# Patient Record
Sex: MALE | Race: WHITE
[De-identification: names, ages, dates, MRNs, and addresses within clinical notes are randomized per-mention and may not be internally consistent; named-entity substitution may affect disease eponyms.]

---

## 2017-01-15 ENCOUNTER — HOSPITAL ENCOUNTER (INPATIENT)
Dept: HOSPITAL 62 - ER | Age: 47
LOS: 3 days | Discharge: HOME | DRG: 193 | End: 2017-01-18
Attending: INTERNAL MEDICINE | Admitting: INTERNAL MEDICINE
Payer: MEDICAID

## 2017-01-15 DIAGNOSIS — J44.1: ICD-10-CM

## 2017-01-15 DIAGNOSIS — J84.89: ICD-10-CM

## 2017-01-15 DIAGNOSIS — G47.33: ICD-10-CM

## 2017-01-15 DIAGNOSIS — J96.21: ICD-10-CM

## 2017-01-15 DIAGNOSIS — J18.9: Primary | ICD-10-CM

## 2017-01-15 DIAGNOSIS — J96.22: ICD-10-CM

## 2017-01-15 DIAGNOSIS — F17.210: ICD-10-CM

## 2017-01-15 DIAGNOSIS — D35.2: ICD-10-CM

## 2017-01-15 DIAGNOSIS — E03.9: ICD-10-CM

## 2017-01-15 DIAGNOSIS — E87.2: ICD-10-CM

## 2017-01-15 LAB
ANION GAP SERPL CALC-SCNC: 10 MMOL/L (ref 5–19)
BASE EXCESS BLDA CALC-SCNC: 0.8 MMOL/L
BASE EXCESS BLDV CALC-SCNC: -0.4 MMOL/L
BASOPHILS # BLD AUTO: 0 10^3/UL (ref 0–0.2)
BASOPHILS NFR BLD AUTO: 0.3 % (ref 0–2)
BUN SERPL-MCNC: 17 MG/DL (ref 7–20)
CALCIUM: 9.6 MG/DL (ref 8.4–10.2)
CHLORIDE SERPL-SCNC: 104 MMOL/L (ref 98–107)
CK SERPL-CCNC: 54 U/L (ref 55–170)
CO2 SERPL-SCNC: 33 MMOL/L (ref 22–30)
CREAT SERPL-MCNC: 0.73 MG/DL (ref 0.52–1.25)
EOSINOPHIL # BLD AUTO: 0.1 10^3/UL (ref 0–0.6)
EOSINOPHIL NFR BLD AUTO: 0.9 % (ref 0–6)
ERYTHROCYTE [DISTWIDTH] IN BLOOD BY AUTOMATED COUNT: 15.6 % (ref 11.5–14)
GLUCOSE SERPL-MCNC: 104 MG/DL (ref 75–110)
HCO3 BLDV-SCNC: 32.5 MMOL/L (ref 20–32)
HCT VFR BLD CALC: 46.5 % (ref 37.9–51)
HGB BLD-MCNC: 14.1 G/DL (ref 13.5–17)
HGB HCT DIFFERENCE: -4.2
LYMPHOCYTES # BLD AUTO: 1.1 10^3/UL (ref 0.5–4.7)
LYMPHOCYTES NFR BLD AUTO: 7.4 % (ref 13–45)
MCH RBC QN AUTO: 28.5 PG (ref 27–33.4)
MCHC RBC AUTO-ENTMCNC: 30.4 G/DL (ref 32–36)
MCV RBC AUTO: 94 FL (ref 80–97)
MONOCYTES # BLD AUTO: 1.9 10^3/UL (ref 0.1–1.4)
MONOCYTES NFR BLD AUTO: 12.2 % (ref 3–13)
NEUTROPHILS # BLD AUTO: 12 10^3/UL (ref 1.7–8.2)
NEUTS SEG NFR BLD AUTO: 79.2 % (ref 42–78)
PCO2 BLDV: 101.2 MMHG (ref 35–63)
PH BLDV: 7.13 [PH] (ref 7.3–7.42)
POTASSIUM SERPL-SCNC: 4.4 MMOL/L (ref 3.6–5)
RBC # BLD AUTO: 4.95 10^6/UL (ref 4.35–5.55)
SAO2 % BLDA: 99.4 % (ref 94–98)
SODIUM SERPL-SCNC: 146.6 MMOL/L (ref 137–145)
TROPONIN I SERPL-MCNC: < 0.012 NG/ML
WBC # BLD AUTO: 15.2 10^3/UL (ref 4–10.5)

## 2017-01-15 PROCEDURE — 87040 BLOOD CULTURE FOR BACTERIA: CPT

## 2017-01-15 PROCEDURE — 99285 EMERGENCY DEPT VISIT HI MDM: CPT

## 2017-01-15 PROCEDURE — 94660 CPAP INITIATION&MGMT: CPT

## 2017-01-15 PROCEDURE — 86738 MYCOPLASMA ANTIBODY: CPT

## 2017-01-15 PROCEDURE — 82550 ASSAY OF CK (CPK): CPT

## 2017-01-15 PROCEDURE — 80048 BASIC METABOLIC PNL TOTAL CA: CPT

## 2017-01-15 PROCEDURE — 83880 ASSAY OF NATRIURETIC PEPTIDE: CPT

## 2017-01-15 PROCEDURE — 84484 ASSAY OF TROPONIN QUANT: CPT

## 2017-01-15 PROCEDURE — 93005 ELECTROCARDIOGRAM TRACING: CPT

## 2017-01-15 PROCEDURE — 83605 ASSAY OF LACTIC ACID: CPT

## 2017-01-15 PROCEDURE — 82803 BLOOD GASES ANY COMBINATION: CPT

## 2017-01-15 PROCEDURE — 85027 COMPLETE CBC AUTOMATED: CPT

## 2017-01-15 PROCEDURE — 96365 THER/PROPH/DIAG IV INF INIT: CPT

## 2017-01-15 PROCEDURE — 71020: CPT

## 2017-01-15 PROCEDURE — 96361 HYDRATE IV INFUSION ADD-ON: CPT

## 2017-01-15 PROCEDURE — 96375 TX/PRO/DX INJ NEW DRUG ADDON: CPT

## 2017-01-15 PROCEDURE — 36600 WITHDRAWAL OF ARTERIAL BLOOD: CPT

## 2017-01-15 PROCEDURE — 87804 INFLUENZA ASSAY W/OPTIC: CPT

## 2017-01-15 PROCEDURE — 93010 ELECTROCARDIOGRAM REPORT: CPT

## 2017-01-15 PROCEDURE — 36415 COLL VENOUS BLD VENIPUNCTURE: CPT

## 2017-01-15 PROCEDURE — 94640 AIRWAY INHALATION TREATMENT: CPT

## 2017-01-15 PROCEDURE — 71275 CT ANGIOGRAPHY CHEST: CPT

## 2017-01-15 PROCEDURE — 85025 COMPLETE CBC W/AUTO DIFF WBC: CPT

## 2017-01-15 RX ADMIN — IPRATROPIUM BROMIDE AND ALBUTEROL SULFATE SCH ML: 2.5; .5 SOLUTION RESPIRATORY (INHALATION) at 20:48

## 2017-01-15 RX ADMIN — SODIUM CHLORIDE PRN ML: 9 INJECTION, SOLUTION INTRAVENOUS at 20:13

## 2017-01-15 NOTE — EKG REPORT
SEVERITY:- BORDERLINE ECG -

SINUS TACHYCARDIA

BORDERLINE RIGHT AXIS DEVIATION

BORDERLINE T ABNORMALITIES, INFERIOR LEADS

:

Confirmed by: Marcia Phillips MD 15-Asad-2017 18:34:17

## 2017-01-15 NOTE — PDOC H&P
History of Present Illness


Admission Date/PCP: 


  





  ASHLEY CANNON MD





Patient complains of: Shortness of breath


History of Present Illness: 


MARK PAYNE is a 46 year old male presents to the emergency department 

with a two-week history of worsening shortness of breath initially noted only 

during exertion and now during rest with associated cough productive of yellow 

phlegm.  Also associated with fevers and chills at home.  He denies sore throat 

or abdominal pain nausea vomiting or diarrhea.  He denies sick contacts.  He 

works as a  and has no MRSA or pseudomonas exposure that he is aware of 

likewise no tuberculosis exposure.  He denies night sweats or abnormal weight 

loss or hemoptysis.  He continues to smoke half pack cigarettes per day but has 

never been diagnosed with emphysema or COPD.  He refuses influenza vaccine.  

His only chronic medical problem is a new diagnosis of pituitary adenoma 

discovered during evaluation of weight gain which showed hypothyroidism leading 

to further evaluation of the pituitary axis.  He is not on steroid therapy.  He 

has no underlying asthma or chronic bronchitis never suffered as a child.





Evaluation in the emergency department including a CT angiography showed no 

evidence of pulmonary embolus however there was a multilobar pneumonia found.  

His arterial blood gas shows hypoxemia and hypercarbia, he's placed on BiPAP 

therapy for a pH of 7.1 and PCO2 of 101.  I find the patient awake alert lucid 

and talkative.








Past Medical History


Cardiac Medical History: Reports: None


Pulmonary Medical History: Reports: None


Endocrine Medical History: Reports: Hypothyroidism, Other - Pituitary adenoma





Past Surgical History


Past Surgical History: Reports: None





Social History


Smoking Status: Current Every Day Smoker


Cigarettes Packs Per Day: 0.5


Frequency of Alcohol Use: None


Hx Recreational Drug Use: No





Family History


Family History: Reviewed & Not Pertinent.  denies: COPD


Parental Family History Reviewed: Yes


Children Family History Reviewed: Yes


Sibling(s) Family History Reviewed.: Yes





Medication/Allergy


Home Medications: 








No Home Medications  01/15/17 








Allergies/Adverse Reactions: 


 





No Known Allergies Allergy (Unverified 01/15/17 17:08)


 











Review of Systems


Constitutional: PRESENT: chills, fever(s), weight gain.  ABSENT: headache(s), 

night sweats, weight loss


Eyes: ABSENT: visual disturbances


Ears: ABSENT: hearing changes


Cardiovascular: PRESENT: dyspnea on exertion.  ABSENT: chest pain, edema, 

orthropnea, palpitations


Respiratory: PRESENT: cough, dyspnea, sputum.  ABSENT: hemoptysis


Gastrointestinal: ABSENT: abdominal pain, constipation, diarrhea, hematemesis, 

hematochezia, nausea, vomiting


Genitourinary: ABSENT: dysuria, hematuria


Musculoskeletal: ABSENT: joint swelling


Integumentary: ABSENT: rash, wounds


Neurological: ABSENT: abnormal gait, abnormal speech, confusion, dizziness, 

focal weakness, syncope


Psychiatric: ABSENT: anxiety, depression


Endocrine: PRESENT: cold intolerance, heat intolerance.  ABSENT: polydipsia, 

polyuria


Hematologic/Lymphatic: ABSENT: easy bleeding, easy bruising





Physical Exam


Vital Signs: 


 











Temp Pulse Resp BP Pulse Ox


 


 98.0 F   121 H  11 L  165/94 H  91 L


 


 01/15/17 13:34  01/15/17 13:34  01/15/17 15:21  01/15/17 13:34  01/15/17 15:21








 Intake & Output











 01/14/17 01/15/17 01/16/17





 06:59 06:59 06:59


 


Weight   122.9 kg











General appearance: PRESENT: no acute distress, obese, well-developed


Head exam: PRESENT: atraumatic, normocephalic


Eye exam: PRESENT: conjunctiva pink, EOMI, PERRLA.  ABSENT: scleral icterus


Mouth exam: PRESENT: moist, tongue midline


Neck exam: ABSENT: carotid bruit, JVD, lymphadenopathy, thyromegaly


Respiratory exam: PRESENT: crackles, unlabored.  ABSENT: rales, rhonchi, wheezes


Cardiovascular exam: PRESENT: RRR, tachycardia.  ABSENT: diastolic murmur, rubs

, systolic murmur


Pulses: PRESENT: normal dorsalis pedis pul


Vascular exam: PRESENT: normal capillary refill


GI/Abdominal exam: PRESENT: normal bowel sounds, soft.  ABSENT: distended, 

guarding, rebound, tenderness


Rectal exam: PRESENT: deferred


Extremities exam: PRESENT: full ROM.  ABSENT: calf tenderness, clubbing, pedal 

edema


Neurological exam: PRESENT: alert, awake, oriented to person, oriented to place

, oriented to time, oriented to situation


Psychiatric exam: PRESENT: appropriate affect, normal mood


Skin exam: PRESENT: dry, intact, warm.  ABSENT: cyanosis, rash





Results


Laboratory Results: 


 





 01/15/17 13:50 





 01/15/17 13:50 





 











  01/15/17 01/15/17 01/15/17





  13:50 13:50 13:50


 


WBC  15.2 H  


 


RBC  4.95  


 


Hgb  14.1  


 


Hct  46.5  


 


MCV  94  


 


MCH  28.5  


 


MCHC  30.4 L  


 


RDW  15.6 H  


 


Plt Count  310  


 


Seg Neutrophils %  79.2 H  


 


Lymphocytes %  7.4 L  


 


Monocytes %  12.2  


 


Eosinophils %  0.9  


 


Basophils %  0.3  


 


Absolute Neutrophils  12.0 H  


 


Absolute Lymphocytes  1.1  


 


Absolute Monocytes  1.9 H  


 


Absolute Eosinophils  0.1  


 


Absolute Basophils  0.0  


 


VBG pH    7.13 L*


 


VBG pCO2    101.2 H*


 


VBG HCO3    32.5 H


 


VBG Base Excess    -0.4


 


Sodium   146.6 H 


 


Potassium   4.4 


 


Chloride   104 


 


Carbon Dioxide   33 H 


 


Anion Gap   10 


 


BUN   17 


 


Creatinine   0.73 


 


Est GFR ( Amer)   > 60 


 


Est GFR (Non-Af Amer)   > 60 


 


Glucose   104 


 


Lactic Acid   


 


Calcium   9.6 














  01/15/17





  14:40


 


WBC 


 


RBC 


 


Hgb 


 


Hct 


 


MCV 


 


MCH 


 


MCHC 


 


RDW 


 


Plt Count 


 


Seg Neutrophils % 


 


Lymphocytes % 


 


Monocytes % 


 


Eosinophils % 


 


Basophils % 


 


Absolute Neutrophils 


 


Absolute Lymphocytes 


 


Absolute Monocytes 


 


Absolute Eosinophils 


 


Absolute Basophils 


 


VBG pH 


 


VBG pCO2 


 


VBG HCO3 


 


VBG Base Excess 


 


Sodium 


 


Potassium 


 


Chloride 


 


Carbon Dioxide 


 


Anion Gap 


 


BUN 


 


Creatinine 


 


Est GFR ( Amer) 


 


Est GFR (Non-Af Amer) 


 


Glucose 


 


Lactic Acid  < 0.5 L


 


Calcium 








 











  01/15/17 01/15/17





  13:50 13:50


 


Creatine Kinase  54 L 


 


Troponin I   < 0.012


 


NT-Pro-B Natriuret Pep   77











Impressions: 


 





Chest X-Ray  01/15/17 14:10


IMPRESSION:  Patchy predominately perihilar airspace densities are identified 

which could represent pneumonic infiltrates or pulmonary edema.  No pleural 

effusions are identified.  Other findings as noted above.


 








Chest/Abdomen CTA  01/15/17 14:49


IMPRESSION:  1.  No central or segmental pulmonary embolus.


2.  Constellation of findings favor to represent multi focal pneumonia.


3.  Small pericardial effusion.


 














Assessment & Plan





- Diagnosis


(1) Pneumonia


Qualifiers: 


     Pneumonia type: due to unspecified organism     Laterality: bilateral     

Lung location: unspecified part of lung     Qualified Code(s): J18.9 - Pneumonia

, unspecified organism  


Is this a current diagnosis for this admission?: YesPlan: 


Admitted to [facility] for continued BiPAP therapy through the night, repeat arterial 

blood gas in 1 hour.  Empiric Rocephin and Zithromax.  Follow-up on influenza 

screen in the emergency department and start Tamiflu if indicated.  Supple 

oxygen as needed.  Scheduled DuoNeb's with albuterol when necessary, flutter 

valve.








(2) Hypercapnia


Is this a current diagnosis for this admission?: YesPlan: 


As above








(3) Hypoxia


Is this a current diagnosis for this admission?: YesPlan: 


As above








(4) Sepsis


Qualifiers: 


     Sepsis type: sepsis due to unspecified organism     Qualified Code(s): 

A41.9 - Sepsis, unspecified organism  


Is this a current diagnosis for this admission?: YesPlan: 


Evidence by tachypnea, leukocytosis and source.








(5) Pituitary adenoma


Is this a current diagnosis for this admission?: YesPlan: 


Workup underway as an outpatient, already scheduled to see neurosurgery in 

[facility].








(6) CORAL (obstructive sleep apnea)


Is this a current diagnosis for this admission?: YesPlan: 


Nocturnal O2 dependent based on previous sleep study, patient denies nocturnal 

CPAP.  However given his known apnea and current multi lobar pneumonia we'll 

continue BiPAP through the night and reevaluate in the morning.











- Time


Time Spent: Greater than 70 Minutes





- Inpatient Certification


Based on my medical assessment, after consideration of the patient's 

comorbidities, presenting symptoms, or acuity I expect that the services needed 

warrant INPATIENT care.: Yes


I certify that my determination is in accordance with my understanding of 

Medicare's requirements for reasonable and necessary INPATIENT services [42 CFR 

412.3e].: Yes


Medical Necessity: Failure to Improve With Outpatient Therapy, Need Close 

Monitoring Due to Risk of Patient Decompensation, Need For IV Fluids, Need for 

Nebulizer Therapy and Monitoring of Response

## 2017-01-15 NOTE — ER DOCUMENT REPORT
ED Respiratory Problem





- General


Chief Complaint: Shortness Of Breath


Stated Complaint: SHORTNESS OF BREATH


Notes: 


The patient is a 46-year-old male, past medical history current smoker, 

pituitary mass (currently being worked up as outpatient), presents with 2 days 

of dry cough, increasing shortness of breath and mild confusion earlier today.  

On arrival to the emergency room his pulse ox is 78%.  He does not wear oxygen 

at home.  According to his wife, his confusion has resolved after he was placed 

on oxygen in the emergency room.  He denies chest pain, leg swelling, nausea, 

vomiting, focal weakness, rash, back pain, abdominal pain, sputum or fevers.





Past Medical History





- General


Information source: Patient





- Social History


Smoking Status: Current Every Day Smoker


Family History: Reviewed & Not Pertinent





Review of Systems





- Review of Systems


Notes: 


REVIEW OF SYSTEMS:


CONSTITUTIONAL: -fevers, -chills


EENT: -eye pain, -difficulty swallowing, -nasal congestion


CARDIOVASCULAR: -chest pain, -syncope.


RESPIRATORY: -+cough, +SOB


GASTROINTESTINAL: -abdominal pain, - nausea, -vomiting, -diarrhea


GENITOURINARY: -dysuria, -hematuria


MUSCULOSKELETAL: -back pain, -neck pain


SKIN: -rash or skin lesions.


HEMATOLOGIC: -easy bruising or bleeding.


LYMPHATIC: -swollen, enlarged glands.


NEUROLOGICAL: +confusion, -headache, -blurry vision


PSYCHIATRIC: -anxiety, -depression.


ALL OTHER SYSTEMS REVIEWED AND NEGATIVE.





Physical Exam





- Vital signs


Vitals: 


 











Temp Pulse BP Pulse Ox


 


 98.0 F   121 H  165/94 H  64 L


 


 01/15/17 13:34  01/15/17 13:34  01/15/17 13:34  01/15/17 13:34








Temp 98.0, RR 28, Pulse 123, Pulse Ox 78%





- Notes


Notes: 


PHYSICAL EXAMINATION:





GENERAL: Well-appearing, well-nourished and in no acute distress.





HEAD: Atraumatic, normocephalic.





EYES: Pupils equal round and reactive to light, extraocular movements intact, 

sclera anicteric, conjunctiva are normal.





ENT: nares patent, oropharynx clear without exudates.  Moist mucous membranes.





NECK: Normal range of motion, supple without lymphadenopathy





LUNGS: Tachypneic, bilateral wheezing, B/L crackles





HEART: Tachycardia, regular rhythm





ABDOMEN: Soft, nontender, normoactive bowel sounds.  No guarding, no rebound.  

No masses appreciated.





EXTREMITIES: Normal range of motion, no pitting or edema.  No cyanosis.  No 

calf tenderness.





NEUROLOGICAL: Cranial nerves grossly intact.  Normal speech, normal gait.  

Normal sensory, motor, and reflex exams.





PSYCH: Normal mood, normal affect.





SKIN: Warm, Dry, normal turgor, no rashes or lesions noted.





Course





- Re-evaluation


Re-evalutation: 


Patient is hypercapnic, hypoxic respiratory failure.  Patient placed on BiPAP 

and is in no respiratory distress at this time.  CTA does not show PE, but does 

show evidence of multifocal pneumonia.  With leukocytosis, tachycardia and 

tachypnea, patient meets sepsis. CAP antibiotics started.  He does not meet 

severe sepsis criteria with a normal blood pressure and normal lactate.  

Patient requires inpatient admission for further evaluation and treatment.  

Spoke to Dr. Madison at 1630 and he has accepted patient as inpatient.





- Vital Signs


Vital signs: 


 











Temp Pulse Resp BP Pulse Ox


 


 98.0 F   121 H  11 L  165/94 H  91 L


 


 01/15/17 13:34  01/15/17 13:34  01/15/17 15:21  01/15/17 13:34  01/15/17 15:21














- Laboratory


Result Diagrams: 


 01/15/17 13:50





 01/15/17 13:50


Laboratory results interpreted by me: 


 











  01/15/17 01/15/17 01/15/17





  13:50 13:50 13:50


 


WBC  15.2 H  


 


MCHC  30.4 L  


 


RDW  15.6 H  


 


Seg Neutrophils %  79.2 H  


 


Lymphocytes %  7.4 L  


 


Absolute Neutrophils  12.0 H  


 


Absolute Monocytes  1.9 H  


 


VBG pH    7.13 L*


 


VBG pCO2    101.2 H*


 


VBG HCO3    32.5 H


 


Sodium   146.6 H 


 


Carbon Dioxide   33 H 


 


Lactic Acid   


 


Creatine Kinase   54 L 














  01/15/17





  14:40


 


WBC 


 


MCHC 


 


RDW 


 


Seg Neutrophils % 


 


Lymphocytes % 


 


Absolute Neutrophils 


 


Absolute Monocytes 


 


VBG pH 


 


VBG pCO2 


 


VBG HCO3 


 


Sodium 


 


Carbon Dioxide 


 


Lactic Acid  < 0.5 L


 


Creatine Kinase 














- Diagnostic Test


Radiology reviewed: Image reviewed, Reports reviewed


Radiology results interpreted by me: 


CTA: No PE.  Evidence of multifocal pneumonia.





- EKG Interpretation by Me


EKG shows normal: Sinus rhythm, Intervals, QRS Complexes, ST-T Waves


Rate: Tachycardia - 116


Axis/QRS: Right axis deviation





Discharge





- Discharge


Clinical Impression: 


 Hypoxia, Acute respiratory acidosis, Hypercapnia





Pneumonia


Qualifiers:


 Pneumonia type: due to unspecified organism Laterality: bilateral Lung location

: unspecified part of lung Qualified Code(s): J18.9 - Pneumonia, unspecified 

organism





Sepsis


Qualifiers:


 Sepsis type: sepsis due to unspecified organism Qualified Code(s): A41.9 - 

Sepsis, unspecified organism





Condition: Serious


Disposition: ADMITTED AS INPATIENT


Admitting Provider: Hospitalist [facility]


Unit Admitted: IMCU


Referrals: 


ASHLEY CANNON MD [Primary Care Provider] - Follow up as needed

## 2017-01-16 LAB
ANION GAP SERPL CALC-SCNC: 9 MMOL/L (ref 5–19)
BASE EXCESS BLDA CALC-SCNC: 4.5 MMOL/L
BASE EXCESS BLDV CALC-SCNC: -0.4 MMOL/L
BASE EXCESS BLDV CALC-SCNC: 1.1 MMOL/L
BUN SERPL-MCNC: 14 MG/DL (ref 7–20)
CALCIUM: 9.4 MG/DL (ref 8.4–10.2)
CHLORIDE SERPL-SCNC: 104 MMOL/L (ref 98–107)
CO2 SERPL-SCNC: 30 MMOL/L (ref 22–30)
CREAT SERPL-MCNC: 0.65 MG/DL (ref 0.52–1.25)
ERYTHROCYTE [DISTWIDTH] IN BLOOD BY AUTOMATED COUNT: 16 % (ref 11.5–14)
GLUCOSE SERPL-MCNC: 95 MG/DL (ref 75–110)
HCO3 BLDV-SCNC: 30.6 MMOL/L (ref 20–32)
HCO3 BLDV-SCNC: 31.6 MMOL/L (ref 20–32)
HCT VFR BLD CALC: 42.7 % (ref 37.9–51)
HGB BLD-MCNC: 13 G/DL (ref 13.5–17)
HGB HCT DIFFERENCE: -3.7
MCH RBC QN AUTO: 28.7 PG (ref 27–33.4)
MCHC RBC AUTO-ENTMCNC: 30.4 G/DL (ref 32–36)
MCV RBC AUTO: 95 FL (ref 80–97)
PCO2 BLDV: 81.6 MMHG (ref 35–63)
PCO2 BLDV: 84.2 MMHG (ref 35–63)
PH BLDV: 7.18 [PH] (ref 7.3–7.42)
PH BLDV: 7.21 [PH] (ref 7.3–7.42)
POTASSIUM SERPL-SCNC: 4.6 MMOL/L (ref 3.6–5)
RBC # BLD AUTO: 4.52 10^6/UL (ref 4.35–5.55)
SAO2 % BLDA: 92 % (ref 94–98)
SODIUM SERPL-SCNC: 142.9 MMOL/L (ref 137–145)
WBC # BLD AUTO: 12.2 10^3/UL (ref 4–10.5)

## 2017-01-16 RX ADMIN — IPRATROPIUM BROMIDE AND ALBUTEROL SULFATE SCH ML: 2.5; .5 SOLUTION RESPIRATORY (INHALATION) at 13:27

## 2017-01-16 RX ADMIN — AZITHROMYCIN MONOHYDRATE SCH MG: 500 INJECTION, POWDER, LYOPHILIZED, FOR SOLUTION INTRAVENOUS at 12:06

## 2017-01-16 RX ADMIN — LANSOPRAZOLE SCH MG: 30 TABLET, ORALLY DISINTEGRATING, DELAYED RELEASE ORAL at 05:17

## 2017-01-16 RX ADMIN — DOCUSATE SODIUM SCH MG: 100 CAPSULE, LIQUID FILLED ORAL at 10:33

## 2017-01-16 RX ADMIN — METHYLPREDNISOLONE SODIUM SUCCINATE SCH MG: 40 INJECTION, POWDER, FOR SOLUTION INTRAMUSCULAR; INTRAVENOUS at 22:00

## 2017-01-16 RX ADMIN — ENOXAPARIN SODIUM SCH MG: 40 INJECTION SUBCUTANEOUS at 10:32

## 2017-01-16 RX ADMIN — PROBIOTIC PRODUCT - TAB SCH MG: TAB at 17:42

## 2017-01-16 RX ADMIN — CEFTRIAXONE SCH ML: 1 INJECTION, SOLUTION INTRAVENOUS at 10:34

## 2017-01-16 RX ADMIN — ACETAMINOPHEN PRN MG: 325 TABLET ORAL at 05:18

## 2017-01-16 RX ADMIN — SODIUM CHLORIDE PRN ML: 9 INJECTION, SOLUTION INTRAVENOUS at 10:33

## 2017-01-16 RX ADMIN — IPRATROPIUM BROMIDE AND ALBUTEROL SULFATE SCH ML: 2.5; .5 SOLUTION RESPIRATORY (INHALATION) at 08:00

## 2017-01-16 RX ADMIN — IPRATROPIUM BROMIDE AND ALBUTEROL SULFATE SCH ML: 2.5; .5 SOLUTION RESPIRATORY (INHALATION) at 20:04

## 2017-01-16 RX ADMIN — IPRATROPIUM BROMIDE AND ALBUTEROL SULFATE SCH ML: 2.5; .5 SOLUTION RESPIRATORY (INHALATION) at 02:38

## 2017-01-16 NOTE — PDOC PROGRESS REPORT
Subjective


Progress Note for:: 01/16/17


Subjective:: 


MARK PAYNE is a 46 year old male presents to the emergency department 

with a two-week history of worsening shortness of breath initially noted only 

during exertion and now during rest with associated cough productive of yellow 

phlegm.  Also associated with fevers and chills at home.  He denies sore throat 

or abdominal pain nausea vomiting or diarrhea.  He denies sick contacts.  He 

works as a  and has no MRSA or pseudomonas exposure that he is aware of 

likewise no tuberculosis exposure.  He denies night sweats or abnormal weight 

loss or hemoptysis.  He continues to smoke half pack cigarettes per day but has 

never been diagnosed with emphysema or COPD.  He refuses influenza vaccine.  

His only chronic medical problem is a new diagnosis of pituitary adenoma 

discovered during evaluation of weight gain which showed hypothyroidism leading 

to further evaluation of the pituitary axis.  He is not on steroid therapy.  He 

has no underlying asthma or chronic bronchitis never suffered as a child.





Evaluation in the emergency department including a CT angiography showed no 

evidence of pulmonary embolus however there was a multilobar pneumonia found.  

His arterial blood gas shows hypoxemia and hypercarbia, he's placed on BiPAP 

therapy for a pH of 7.1 and PCO2 of 101.  I find the patient awake alert lucid 

and talkative.





Dr. Siegel titrated his BiPAP during the night and he has pH and PCO2 

responded accordingly.  The patient remains wide awake and alert and talkative 

completely lucid at this time.  His wife has arrived at the bedside and tells 

me however that over the course the 24 hours preceding his admission he become 

more somnolent or confused and even combative at times which was clearly not 

his usual norm.








Physical Exam


Vital Signs: 


 











Temp Pulse Resp BP Pulse Ox


 


 98.8 F   76   20   137/86 H  94 


 


 01/16/17 12:00  01/16/17 13:28  01/16/17 13:28  01/16/17 12:00  01/16/17 13:28








 Intake & Output











 01/15/17 01/16/17 01/17/17





 06:59 06:59 06:59


 


Intake Total  1238 550


 


Balance  1238 550


 


Weight  122.9 kg 











General appearance: PRESENT: no acute distress, obese, well-developed


Head exam: PRESENT: atraumatic, normocephalic


Eye exam: PRESENT: conjunctiva pink, EOMI, PERRLA.  ABSENT: scleral icterus


Mouth exam: PRESENT: moist, tongue midline


Neck exam: ABSENT: carotid bruit, JVD, lymphadenopathy, thyromegaly


Respiratory exam: PRESENT: rhonchi - Diffuse, bilateral, new.  ABSENT: rales, 

wheezes


Cardiovascular exam: PRESENT: RRR.  ABSENT: diastolic murmur, rubs, systolic 

murmur


Pulses: PRESENT: normal dorsalis pedis pul


Vascular exam: PRESENT: normal capillary refill


GI/Abdominal exam: PRESENT: normal bowel sounds, soft.  ABSENT: distended, 

guarding, rebound, tenderness


Rectal exam: PRESENT: deferred


Extremities exam: PRESENT: full ROM.  ABSENT: calf tenderness, clubbing, pedal 

edema


Neurological exam: PRESENT: alert, awake, oriented to person, oriented to place

, oriented to time, oriented to situation


Psychiatric exam: PRESENT: appropriate affect, normal mood


Skin exam: PRESENT: dry, intact, warm.  ABSENT: cyanosis, rash





Results


Laboratory Results: 


 





 01/16/17 07:35 





 01/16/17 07:35 





 











  01/15/17 01/16/17 01/16/17





  21:00 01:16 07:35


 


WBC    12.2 H


 


RBC    4.52


 


Hgb    13.0 L


 


Hct    42.7


 


MCV    95


 


MCH    28.7


 


MCHC    30.4 L


 


RDW    16.0 H


 


Plt Count    265


 


Carbonic Acid  2.72 H  


 


HCO3/H2CO3 Ratio  11:1  


 


ABG pH  7.17 L*  


 


ABG pCO2  90.5 H*  


 


ABG pO2  276.8 H  


 


ABG HCO3  32.5 H  


 


ABG O2 Saturation  99.4 H  


 


ABG Base Excess  0.8  


 


VBG pH   7.21 L 


 


VBG pCO2   81.6 H* 


 


VBG HCO3   31.6 


 


VBG Base Excess   1.1 


 


FiO2  100%  


 


Sodium   


 


Potassium   


 


Chloride   


 


Carbon Dioxide   


 


Anion Gap   


 


BUN   


 


Creatinine   


 


Est GFR ( Amer)   


 


Est GFR (Non-Af Amer)   


 


Glucose   


 


Calcium   














  01/16/17 01/16/17





  07:35 07:35


 


WBC  


 


RBC  


 


Hgb  


 


Hct  


 


MCV  


 


MCH  


 


MCHC  


 


RDW  


 


Plt Count  


 


Carbonic Acid  


 


HCO3/H2CO3 Ratio  


 


ABG pH  


 


ABG pCO2  


 


ABG pO2  


 


ABG HCO3  


 


ABG O2 Saturation  


 


ABG Base Excess  


 


VBG pH   7.18 L*


 


VBG pCO2   84.2 H*


 


VBG HCO3   30.6


 


VBG Base Excess   -0.4


 


FiO2  


 


Sodium  142.9 


 


Potassium  4.6 


 


Chloride  104 


 


Carbon Dioxide  30 


 


Anion Gap  9 


 


BUN  14 


 


Creatinine  0.65 


 


Est GFR ( Amer)  > 60 


 


Est GFR (Non-Af Amer)  > 60 


 


Glucose  95 


 


Calcium  9.4 











Impressions: 


 





Chest X-Ray  01/15/17 14:10


IMPRESSION:  Patchy predominately perihilar airspace densities are identified 

which could represent pneumonic infiltrates or pulmonary edema.  No pleural 

effusions are identified.  Other findings as noted above.


 








Chest/Abdomen CTA  01/15/17 14:49


IMPRESSION:  1.  No central or segmental pulmonary embolus.


2.  Constellation of findings favor to represent multi focal pneumonia.


3.  Small pericardial effusion.


 














Assessment & Plan





- Diagnosis


(1) Pneumonia


Qualifiers: 


     Pneumonia type: due to unspecified organism     Laterality: bilateral     

Lung location: unspecified part of lung     Qualified Code(s): J18.9 - Pneumonia

, unspecified organism  


Is this a current diagnosis for this admission?: YesPlan: 





Community-acquired.  Admitted to [facility] for continued BiPAP therapy as needed.  

Empiric Rocephin and Zithromax.  influenza screen negative, mycoplasma 

antibodies pending.  Supplemental oxygen as needed.  Scheduled DuoNeb's with 

albuterol when necessary, flutter valve.  Pulmonary consult








(2) Hypercapnia


Is this a current diagnosis for this admission?: YesPlan: 


As above








(3) Hypoxia


Is this a current diagnosis for this admission?: YesPlan: 





As above.  Diffuse wheezing today so we will add systemic steroids and monitor 

effect awaiting pulmonology's consult








(4) Sepsis


Qualifiers: 


     Sepsis type: sepsis due to unspecified organism     Qualified Code(s): 

A41.9 - Sepsis, unspecified organism  


Is this a current diagnosis for this admission?: YesPlan: 





Evidence by tachypnea, leukocytosis and source.  Stable








(5) Pituitary adenoma


Is this a current diagnosis for this admission?: YesPlan: 


Workup underway as an outpatient, already scheduled to see neurosurgery in 

[facility].








(6) CORAL (obstructive sleep apnea)


Is this a current diagnosis for this admission?: YesPlan: 





Nocturnal O2 dependent based on previous sleep study, patient denies nocturnal 

CPAP.  However given his known apnea and current multi lobar pneumonia we'll 

continue BiPAP at night and reevaluate as we go.











- Time


Time Spent with patient: 35 or more minutes


Anticipated discharge: Home


Within: within 48 hours

## 2017-01-16 NOTE — CONSULTATION REPORT E
Consultation Report



NAME: MARK PAYNE

MRN:  [id number]               : 1970      AGE: 46Y

DATE: 2017     336  A



TO:   HOUSTON POLK M.D.



FROM: CHANI RASHID M.D.

      Requesting Physician



HISTORY OF PRESENT ILLNESS:

Patient is a 46-year-old  male who came in for increased

shortness of breath associated with increased coughing productively with

yellow greenish sputum over the last 1 or 2 weeks.  Condition worsened. 

Thus, patient came into the Emergency Room eventually.  Patient claimed

that he had fever and chills at home.  Patient was admitted about 2 days

ago, was started on azithromycin, ceftriaxone, appeared to be feeling a

lot better.  He was placed on BiPAP when he came in and tolerated it well.

Currently, the patient is feeling a lot better.  No fever over the last

24-48 hours.  No chills.  No hemoptysis.  No chest pain.  Currently not on

BiPAP and not in apparent respiratory distress.  Patient claimed that he

smokes about 1 pack a day for several years.  Was working as a  about 1 
or 2 weeks ago

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



PAST MEDICAL HISTORY:

The patient has past medical history of:

1.  Hypothyroidism.

2.  Pituitary adenoma.



Denies any history of hypertension or heart problem.



PAST SURGICAL HISTORY:

None.



SOCIAL HISTORY:

Patient smokes 1 pack a day every day for several years.  Denies alcohol

abuse or illicit drug use.  Denies marijuana or cocaine use.



FAMILY HISTORY:

Unremarkable.



HOME MEDICATIONS:

None.



ALLERGIES:

No known drug allergies.



REVIEW OF SYSTEMS:

CONSTITUTIONAL:  Claimed to have fever and chills at home prior to

admission.



EYES, EARS, NOSE AND THROAT:  No ear drainage, no nasal discharge.  No

blurry vision.



CARDIOVASCULAR:  Denies any history of hypertension, arrhythmias, or heart

problems.  Denies any chest pain.



RESPIRATORY:   Complained about increased shortness of breath and coughing

productively with yellow greenish sputum, chest tightness.



GASTROINTESTINAL:  No nausea, vomiting, diarrhea.



GENITOURINARY:  No dysuria or hematuria, or passing renal stone.



EXTREMITIES:  No joint swelling or cellulitis.



PHYSICAL EXAMINATION:

GENERAL:  Patient is awake, alert, oriented x3.



VITAL SIGNS:  Temperature 98.9 with a T-max of 98.9 over the last 24

hours.  Heart rate 105.  Blood pressure is 121/71.  Saturation is 98% on

3 L on nasal cannula.



HEENT:  Eyes:  No jaundice or pallor.  Ears, nose and throat:  No ear

drainage or nasal discharge.  Head and neck:  No scalp swelling or

tenderness.  Neck supple.



CHEST/LUNGS:  No wheezing, no rhonchi.  No coarse crackles.



CARDIOVASCULAR:  S1 and S2 distinct.  Normal rate and regular rhythm.



ABDOMEN:  Flabby.  Positive bowel sounds.  Soft, nondistended.



EXTREMITIES:  No joint swelling.  No cellulitis.



LABORATORY:

CBC done today showed white count of 12.2 from 15.2 yesterday; hemoglobin

is 13; hematocrit is 42.7.  The chemistry today shows sodium 142.9,

potassium 4.6, chloride 104, CO2 30, BUN 14, creatinine 0.65.  Lactate

0.05.  Cardiac enzymes were negative.  Anti-BNP was 77.  Blood gas done

this morning at 0735 hours showed venous blood gas pH was 7.18, venous

blood gas pCO2 of 84.2, bicarbonate 13.6.  Blood cultures on 01/15/2017

showed negative growth.



CT scan done on 01/15/2017 showed infiltrate with ground-glass pattern on

the left and right apical lobes and the right lower lobe.  Right lower

lobe showed some crease appearing pattern as well.



ASSESSMENT:

1.  Pneumonia.  Community-acquired pneumonia cannot be completely

excluded.

2.  Interstitial pneumonitis possibly due to occupational exposure as a

 vs hypersensitivity pneumonitis.

3.  History of smoking 1 pack a day for several years.



PLAN/RECOMMENDATIONS:

1.  Continue IV antibiotics.

2.  Sputum culture tonight.

3.  We will repeat ABG tonight.

4.  Recommend tapering the Solu-Medrol to 20 mg q.12 hours.

5.  Titrate FiO2 to give saturation 91-94%.





DICTATING PHYSICIAN: HOUSTON POLK MD,PADDY,MPH





[id number]                  DT: 2017

PHY#: [id number]            DD: 2017

ID:   [id number]           JOB#: [id number]      ACCT: [id number]



cc:HOUSTON POLK M.D.

>







[facility]

## 2017-01-17 LAB
ANION GAP SERPL CALC-SCNC: 9 MMOL/L (ref 5–19)
BUN SERPL-MCNC: 18 MG/DL (ref 7–20)
CALCIUM: 9.3 MG/DL (ref 8.4–10.2)
CHLORIDE SERPL-SCNC: 104 MMOL/L (ref 98–107)
CO2 SERPL-SCNC: 32 MMOL/L (ref 22–30)
CREAT SERPL-MCNC: 0.73 MG/DL (ref 0.52–1.25)
ERYTHROCYTE [DISTWIDTH] IN BLOOD BY AUTOMATED COUNT: 15.4 % (ref 11.5–14)
GLUCOSE SERPL-MCNC: 113 MG/DL (ref 75–110)
HCT VFR BLD CALC: 40.7 % (ref 37.9–51)
HGB BLD-MCNC: 12.4 G/DL (ref 13.5–17)
HGB HCT DIFFERENCE: -3.5
MCH RBC QN AUTO: 28.6 PG (ref 27–33.4)
MCHC RBC AUTO-ENTMCNC: 30.4 G/DL (ref 32–36)
MCV RBC AUTO: 94 FL (ref 80–97)
POTASSIUM SERPL-SCNC: 4.3 MMOL/L (ref 3.6–5)
RBC # BLD AUTO: 4.33 10^6/UL (ref 4.35–5.55)
SODIUM SERPL-SCNC: 144.5 MMOL/L (ref 137–145)
WBC # BLD AUTO: 12.4 10^3/UL (ref 4–10.5)

## 2017-01-17 RX ADMIN — METHYLPREDNISOLONE SODIUM SUCCINATE SCH MG: 40 INJECTION, POWDER, FOR SOLUTION INTRAMUSCULAR; INTRAVENOUS at 10:10

## 2017-01-17 RX ADMIN — SODIUM CHLORIDE PRN ML: 9 INJECTION, SOLUTION INTRAVENOUS at 02:27

## 2017-01-17 RX ADMIN — AZITHROMYCIN MONOHYDRATE SCH MG: 500 INJECTION, POWDER, LYOPHILIZED, FOR SOLUTION INTRAVENOUS at 10:09

## 2017-01-17 RX ADMIN — ENOXAPARIN SODIUM SCH MG: 40 INJECTION SUBCUTANEOUS at 10:09

## 2017-01-17 RX ADMIN — PROBIOTIC PRODUCT - TAB SCH MG: TAB at 18:32

## 2017-01-17 RX ADMIN — IPRATROPIUM BROMIDE AND ALBUTEROL SULFATE SCH ML: 2.5; .5 SOLUTION RESPIRATORY (INHALATION) at 07:49

## 2017-01-17 RX ADMIN — CEFPODOXIME PROXETIL SCH MG: 200 TABLET, FILM COATED ORAL at 22:56

## 2017-01-17 RX ADMIN — IPRATROPIUM BROMIDE AND ALBUTEROL SULFATE SCH ML: 2.5; .5 SOLUTION RESPIRATORY (INHALATION) at 13:06

## 2017-01-17 RX ADMIN — CEFTRIAXONE SCH ML: 1 INJECTION, SOLUTION INTRAVENOUS at 10:09

## 2017-01-17 RX ADMIN — IPRATROPIUM BROMIDE AND ALBUTEROL SULFATE SCH ML: 2.5; .5 SOLUTION RESPIRATORY (INHALATION) at 20:49

## 2017-01-17 RX ADMIN — NICOTINE PRN EACH: 14 PATCH, EXTENDED RELEASE TOPICAL at 01:02

## 2017-01-17 RX ADMIN — DOCUSATE SODIUM SCH MG: 100 CAPSULE, LIQUID FILLED ORAL at 10:10

## 2017-01-17 RX ADMIN — IPRATROPIUM BROMIDE AND ALBUTEROL SULFATE SCH ML: 2.5; .5 SOLUTION RESPIRATORY (INHALATION) at 01:56

## 2017-01-17 RX ADMIN — PROBIOTIC PRODUCT - TAB SCH MG: TAB at 10:10

## 2017-01-17 RX ADMIN — LANSOPRAZOLE SCH MG: 30 TABLET, ORALLY DISINTEGRATING, DELAYED RELEASE ORAL at 06:27

## 2017-01-17 NOTE — PDOC PROGRESS REPORT
Subjective


Progress Note for:: 01/17/17


Subjective:: 


Patient is feeling a lot better


No fever no chills no pleuritic chest pain


He is oxygenating adequately on 4 L/m nasal cannula


Patient states that stay at home he has nasal O2 at night


He did have a sleep study and was found not to be a candidate for C Pap 4 years 

ago





Physical Exam


Vital Signs: 


 











Temp Pulse Resp BP Pulse Ox


 


 98.1 F   119 H  18   125/75   92 


 


 01/17/17 11:25  01/17/17 13:59  01/17/17 13:06  01/17/17 11:25  01/17/17 13:06








 Intake & Output











 01/16/17 01/17/17 01/18/17





 00:59 00:59 00:59


 


Intake Total 300 5146 1863


 


Balance 300 5146 1863


 


Weight  122.9 kg 124.8 kg











General appearance: PRESENT: no acute distress, obese


Head exam: PRESENT: atraumatic, normocephalic


Eye exam: PRESENT: conjunctiva pink, EOMI, PERRLA.  ABSENT: scleral icterus


Neck exam: ABSENT: carotid bruit, JVD, lymphadenopathy, thyromegaly


Respiratory exam: PRESENT: decreased breath sounds, rales - Bilaterally, 

unlabored.  ABSENT: accessory muscle use, prolonged expiratory phas


Cardiovascular exam: PRESENT: tachycardia.  ABSENT: diastolic murmur, systolic 

murmur


Vascular exam: PRESENT: normal capillary refill


GI/Abdominal exam: PRESENT: normal bowel sounds, soft.  ABSENT: distended, 

guarding, mass, organolmegaly, rebound, tenderness


Extremities exam: PRESENT: full ROM.  ABSENT: calf tenderness, clubbing, pedal 

edema


Neurological exam: PRESENT: alert, awake, oriented to person, oriented to place

, oriented to time, oriented to situation, CN II-XII grossly intact.  ABSENT: 

motor sensory deficit


Skin exam: PRESENT: dry, intact, warm.  ABSENT: cyanosis, rash





Results


Laboratory Results: 


 





 01/17/17 06:18 





 01/17/17 06:18 





 











  01/16/17 01/17/17 01/17/17





  19:00 06:18 06:18


 


WBC   12.4 H 


 


RBC   4.33 L 


 


Hgb   12.4 L 


 


Hct   40.7 


 


MCV   94 


 


MCH   28.6 


 


MCHC   30.4 L 


 


RDW   15.4 H 


 


Plt Count   250 


 


Carbonic Acid  1.70 H  


 


HCO3/H2CO3 Ratio  18:1  


 


ABG pH  7.36  


 


ABG pCO2  56.4 H  


 


ABG pO2  66.0 L  


 


ABG HCO3  31.4 H  


 


ABG O2 Saturation  92.0 L  


 


ABG Base Excess  4.5  


 


FiO2  2.0L  


 


Sodium    144.5


 


Potassium    4.3


 


Chloride    104


 


Carbon Dioxide    32 H


 


Anion Gap    9


 


BUN    18


 


Creatinine    0.73


 


Est GFR ( Amer)    > 60


 


Est GFR (Non-Af Amer)    > 60


 


Glucose    113 H


 


Calcium    9.3











Impressions: 


 





Chest X-Ray  01/15/17 14:10


IMPRESSION:  Patchy predominately perihilar airspace densities are identified 

which could represent pneumonic infiltrates or pulmonary edema.  No pleural 

effusions are identified.  Other findings as noted above.


 








Chest/Abdomen CTA  01/15/17 14:49


IMPRESSION:  1.  No central or segmental pulmonary embolus.


2.  Constellation of findings favor to represent multi focal pneumonia.


3.  Small pericardial effusion.


 














Assessment & Plan





- Diagnosis


(1) Acute and chronic respiratory failure with hypercapnia


Is this a current diagnosis for this admission?: YesPlan: 


Secondary to obstructive sleep apnea ,COPD exacerbation and bilateral pneumonia


Continue steroids nebs antibiotics


Patient initially BiPAP support is now oxygenating adequately on nasal O2


Evaluate patient at discharge for nasal O2 during the day


Repeat sleep study as an outpatient after discharge








(2) CORAL (obstructive sleep apnea)


Is this a current diagnosis for this admission?: Yes





(3) Pituitary adenoma


Is this a current diagnosis for this admission?: YesPlan: 


Workup in progress








(4) Pneumonia


Qualifiers: 


     Pneumonia type: due to unspecified organism     Laterality: bilateral     

Lung location: unspecified part of lung     Qualified Code(s): J18.9 - Pneumonia

, unspecified organism  


Is this a current diagnosis for this admission?: YesPlan: 


Continue antibiotics


We'll switch to by mouth antibiotics vantin and azithromycin


Patient will be treated for 10 days after discharge











- Time


Time Spent with patient: 25-34 minutes

## 2017-01-18 VITALS — DIASTOLIC BLOOD PRESSURE: 86 MMHG | SYSTOLIC BLOOD PRESSURE: 137 MMHG

## 2017-01-18 LAB
ANION GAP SERPL CALC-SCNC: 10 MMOL/L (ref 5–19)
BUN SERPL-MCNC: 23 MG/DL (ref 7–20)
CALCIUM: 9.4 MG/DL (ref 8.4–10.2)
CHLORIDE SERPL-SCNC: 106 MMOL/L (ref 98–107)
CO2 SERPL-SCNC: 30 MMOL/L (ref 22–30)
CREAT SERPL-MCNC: 0.68 MG/DL (ref 0.52–1.25)
ERYTHROCYTE [DISTWIDTH] IN BLOOD BY AUTOMATED COUNT: 15.1 % (ref 11.5–14)
GLUCOSE SERPL-MCNC: 115 MG/DL (ref 75–110)
HCT VFR BLD CALC: 39.5 % (ref 37.9–51)
HGB BLD-MCNC: 12.7 G/DL (ref 13.5–17)
HGB HCT DIFFERENCE: -1.4
MCH RBC QN AUTO: 29.5 PG (ref 27–33.4)
MCHC RBC AUTO-ENTMCNC: 32.1 G/DL (ref 32–36)
MCV RBC AUTO: 92 FL (ref 80–97)
POTASSIUM SERPL-SCNC: 3.6 MMOL/L (ref 3.6–5)
RBC # BLD AUTO: 4.3 10^6/UL (ref 4.35–5.55)
SODIUM SERPL-SCNC: 146.3 MMOL/L (ref 137–145)
WBC # BLD AUTO: 13 10^3/UL (ref 4–10.5)

## 2017-01-18 RX ADMIN — LANSOPRAZOLE SCH MG: 30 TABLET, ORALLY DISINTEGRATING, DELAYED RELEASE ORAL at 09:01

## 2017-01-18 RX ADMIN — CEFPODOXIME PROXETIL SCH MG: 200 TABLET, FILM COATED ORAL at 10:39

## 2017-01-18 RX ADMIN — NICOTINE PRN EACH: 14 PATCH, EXTENDED RELEASE TOPICAL at 01:44

## 2017-01-18 RX ADMIN — PROBIOTIC PRODUCT - TAB SCH MG: TAB at 10:38

## 2017-01-18 RX ADMIN — IPRATROPIUM BROMIDE AND ALBUTEROL SULFATE SCH ML: 2.5; .5 SOLUTION RESPIRATORY (INHALATION) at 13:17

## 2017-01-18 RX ADMIN — ENOXAPARIN SODIUM SCH MG: 40 INJECTION SUBCUTANEOUS at 10:35

## 2017-01-18 RX ADMIN — ACETAMINOPHEN PRN MG: 325 TABLET ORAL at 09:01

## 2017-01-18 RX ADMIN — DOCUSATE SODIUM SCH MG: 100 CAPSULE, LIQUID FILLED ORAL at 10:39

## 2017-01-18 RX ADMIN — IPRATROPIUM BROMIDE AND ALBUTEROL SULFATE SCH ML: 2.5; .5 SOLUTION RESPIRATORY (INHALATION) at 01:46

## 2017-01-18 RX ADMIN — IPRATROPIUM BROMIDE AND ALBUTEROL SULFATE SCH ML: 2.5; .5 SOLUTION RESPIRATORY (INHALATION) at 07:41

## 2017-01-18 NOTE — PDOC DISCHARGE SUMMARY
General





- Admit/Disc Date/PCP


Admission Date/Primary Care Provider: 


  01/15/17 16:48





  ASHLEY CANNON MD





Discharge Date: 01/18/17





- Discharge Diagnosis


(1) Acute and chronic respiratory failure with hypercapnia


Is this a current diagnosis for this admission?: YesSummary: 


Acute on chronic respiratory failure with hypoxemia and hypercapnia


Respiratory failure secondary to bilateral pneumonia COPD exacerbation and 

obstructive sleep apnea


Patient prior to admission is already on nasal O2 at night


He did not wear her C Pap


He was discharged with nasal O2 with ambulation and an O2 concentrator was 

obtained








(2) CORAL (obstructive sleep apnea)


Is this a current diagnosis for this admission?: YesSummary: 


Treated with nasal O2 at night








(3) Pituitary adenoma


Is this a current diagnosis for this admission?: YesSummary: 


Ongoing workup at [facility]


Patient is scheduled for an MRI of the pituitary gland








(4) Pneumonia


Is this a current diagnosis for this admission?: YesSummary: 





Patient was diagnosed of bilateral pneumonia


He was treated with ceftriaxone and Zithromax


And discharge on vantin and azithromycin

















- Additional Information


Discharge Diet: As Tolerated


Discharge Activity: Activity As Tolerated, Balance Activity w/Rest


Home Medications: 








Levothyroxine Sodium [Synthroid] 50 mcg PO DAILY 01/15/17 


Levothyroxine Sodium [Synthroid] 200 mcg PO DAILY 01/15/17 


Simvastatin [Zocor 40 mg Tablet] 40 mg PO QHS 01/15/17 


Acidoph/L.bulg/Bif.b/S.thermop [Bacid Caplet] 1 each PO BID #20 tablet 01/18/17 


Azithromycin [Zithromax 250 mg Tablet] 500 mg PO DAILY #10 tablet 01/18/17 


Cefpodoxime Proxetil [Vantin 200 mg Tablet] 1 tab PO Q12 #20 tab 01/18/17 


Nicotine [Nicoderm 14 mg/24 Hr Transdermal Patch] 1 each TD DAILYP PRN #30 

patch.td24 01/18/17 


Prednisone [Deltasone 20 mg Tablet] 40 mg PO DAILY #50 tablet 01/18/17 











History of Present Illness


Patient complains of: Shortness of breath


History of Present Illness: 


MARK PAYNE is a 46 year old male presents to the emergency department 

with a two-week history of worsening shortness of breath initially noted only 

during exertion and now during rest with associated cough productive of yellow 

phlegm.  Also associated with fevers and chills at home.  He denies sore throat 

or abdominal pain nausea vomiting or diarrhea.  He denies sick contacts.  He 

works as a  and has no MRSA or pseudomonas exposure that he is aware of 

likewise no tuberculosis exposure.  He denies night sweats or abnormal weight 

loss or hemoptysis.  He continues to smoke half pack cigarettes per day but has 

never been diagnosed with emphysema or COPD.  He refuses influenza vaccine.  

His only chronic medical problem is a new diagnosis of pituitary adenoma 

discovered during evaluation of weight gain which showed hypothyroidism leading 

to further evaluation of the pituitary axis.  He is not on steroid therapy.  He 

has no underlying asthma or chronic bronchitis never suffered as a child.





Evaluation in the emergency department including a CT angiography showed no 

evidence of pulmonary embolus however there was a multilobar pneumonia found.  

His arterial blood gas shows hypoxemia and hypercarbia, he's placed on BiPAP 

therapy for a pH of 7.1 and PCO2 of 101.  I find the patient awake alert lucid 

and talkative.











Hospital Course


Hospital Course: 


Patient was admitted with hypoxemia, bilateral pneumonia


He had an uncomplicated course and responded to ceftriaxone and azithromycin he 

was found to be still hypoxemic with exertion at discharge


He was discharged with nasal O2 on ambulation





Physical Exam


Vital Signs: 


 











Temp Pulse Resp BP Pulse Ox


 


 98.4 F   102 H  18   137/86 H  18 L


 


 01/18/17 14:36  01/18/17 14:36  01/18/17 14:36  01/18/17 14:36  01/18/17 14:36








 Intake & Output











 01/17/17 01/18/17 01/19/17





 00:59 00:59 00:59


 


Intake Total 5146 5429 655


 


Output Total  3 


 


Balance 5146 5426 655


 


Weight 122.9 kg 124.8 kg 122 kg











General appearance: PRESENT: no acute distress


Head exam: PRESENT: atraumatic, normocephalic


Eye exam: PRESENT: conjunctiva pink, EOMI, PERRLA.  ABSENT: scleral icterus


Neck exam: ABSENT: carotid bruit, JVD, lymphadenopathy, thyromegaly


Respiratory exam: PRESENT: decreased breath sounds, symmetrical, wheezes.  

ABSENT: accessory muscle use, prolonged expiratory phas, tachypnea


Cardiovascular exam: PRESENT: RRR.  ABSENT: diastolic murmur, rubs, systolic 

murmur


Pulses: PRESENT: normal dorsalis pedis pul


GI/Abdominal exam: PRESENT: normal bowel sounds, soft.  ABSENT: distended, 

guarding, mass, organolmegaly, rebound, tenderness


Neurological exam: PRESENT: alert, awake, oriented to person, oriented to place

, oriented to time, oriented to situation, CN II-XII grossly intact.  ABSENT: 

motor sensory deficit


Skin exam: PRESENT: dry, intact, warm.  ABSENT: cyanosis, rash





Results


Laboratory Results: 


 





 01/18/17 04:50 





 01/18/17 04:50 





 











  01/18/17 01/18/17





  04:50 04:50


 


WBC  13.0 H 


 


RBC  4.30 L 


 


Hgb  12.7 L 


 


Hct  39.5 


 


MCV  92 


 


MCH  29.5 


 


MCHC  32.1 


 


RDW  15.1 H 


 


Plt Count  267 


 


Sodium   146.3 H


 


Potassium   3.6


 


Chloride   106


 


Carbon Dioxide   30


 


Anion Gap   10


 


BUN   23 H


 


Creatinine   0.68


 


Est GFR ( Amer)   > 60


 


Est GFR (Non-Af Amer)   > 60


 


Glucose   115 H


 


Calcium   9.4











Impressions: 


 





Chest X-Ray  01/15/17 14:10


IMPRESSION:  Patchy predominately perihilar airspace densities are identified 

which could represent pneumonic infiltrates or pulmonary edema.  No pleural 

effusions are identified.  Other findings as noted above.


 








Chest/Abdomen CTA  01/15/17 14:49


IMPRESSION:  1.  No central or segmental pulmonary embolus.


2.  Constellation of findings favor to represent multi focal pneumonia.


3.  Small pericardial effusion.


 














Plan


Discharge Plan: 


Patient was discharged home to follow up with primary care physician and 

pulmonologist


At time of discharge he was stable


Time Spent: Less than 30 Minutes

## 2017-01-18 NOTE — PROGRESS NOTE E
Progress Note



NAME: MARK PAYNE

MRN: [id number]

: 1970             AGE: 46Y

DATE: 2017            ROOM: 336



SUBJECTIVE:

The patient is a 46-year-old  male who came in with severe

respiratory distress and treated for pneumonia and acute respiratory

failure requiring BiPAP therapy.  Currently feeling a lot better.  Denies

any fever.  Denies any nausea, vomiting, diarrhea.  Still coughing

yellow-green phlegm.  The patient was afebrile for the last 24 hours. 

Denies any hemoptysis or chest pain.  He is on home oxygen therapy of 4 L

after sleep study 3 or 4 years ago in [place].



OBJECTIVE:

GENERAL:  The patient is awake, alert, oriented x3, not is apparent

respiratory distress.



VITAL SIGNS:  With a temperature is 98.5 with a T-Max of 98.5.  Blood

pressure is 151/88. Pulse rate is 87.  Respiratory rate 16.  Saturation

92% on room air.



EYES:  No jaundice or pallor.



EARS, NOSE, AND THROAT:  No ear drainage.  No nasal discharge.



HEAD AND NECK:  No scalp swelling or tenderness.  Neck is supple.



CHEST AND LUNGS:  Fine rales bibasilar.  No wheezing.  No coarse crackles.



CARDIOVASCULAR:  S1, S2 is distinct.  No murmur.  Regular rhythm.



ABDOMEN:  Flabby, positive bowel sounds, soft, nondistended.



EXTREMITIES:  No joint swelling.  No cellulitis.



LABORATORY:

CBC done today shows white count of 12.4 from 15.2 two days ago,

hemoglobin is 12.4, hematocrit is 40.7, platelet is 250.  ABG last night

at 7:00 p.m. showed pH of 7.36, pCO2 of 56,and pO2 of 66.4  Saturation is

92% on 2 L.  Chemistry:  Sodium 144, potassium 4.3, CO2 is 22, chloride is

104.  BUN is 18, creatinine 0.73.  Glucose 113, calcium 9.3.  Blood cultures 
yesterday no growth up to 48

hours.



ASSESSMENT:

1.  PNEUMONIA BILATERALLY.

2.  INTERSTITIAL PNEUMONITIS - POSSIBLE ACUTE ON CHRONIC HYPERSENSITIVITY 
PNEUMONITIS.

3.  SEVERE HYPOXEMIA REQUIRING OXYGEN THERAPY.



PLAN/RECOMMENDATION:

1.  Continue antibiotics.

2.  Recommend tapering of the prednisone to 10 mg PO daily.  Patient

needed to be on prednisone for at least 3-4 weeks.

3.  Recommend followup in 2 weeks following hospital discharge.

4.  Recommend home oxygen therapy 2 L nasal cannula during sleep.

5.  Encouraged patient to quit smoking.



Will sign off tonight.  If you have any questions, please feel free to

call me.



DICTATING PHYSICIAN:  HOUSTON POLK MD,PADDY,MPH





[id number]                  DT: 2017

PHY#: [id number]            DD: 2017    [id number]

ID:   [id number]           JOB#: [id number]       ACCT: [id number]



cc:

>







MTDD

## 2017-04-22 ENCOUNTER — HOSPITAL ENCOUNTER (OUTPATIENT)
Dept: HOSPITAL 62 - RAD | Age: 47
End: 2017-04-22
Attending: INTERNAL MEDICINE
Payer: MEDICAID

## 2017-04-22 DIAGNOSIS — G47.30: ICD-10-CM

## 2017-04-22 DIAGNOSIS — R09.02: ICD-10-CM

## 2017-04-22 DIAGNOSIS — J45.909: ICD-10-CM

## 2017-04-22 DIAGNOSIS — J18.9: ICD-10-CM

## 2017-04-22 DIAGNOSIS — R91.8: Primary | ICD-10-CM

## 2017-04-22 DIAGNOSIS — R53.83: ICD-10-CM

## 2017-04-22 DIAGNOSIS — R06.09: ICD-10-CM

## 2017-04-22 PROCEDURE — 71250 CT THORAX DX C-: CPT

## 2018-06-13 ENCOUNTER — HOSPITAL ENCOUNTER (INPATIENT)
Dept: HOSPITAL 62 - ER | Age: 48
LOS: 7 days | Discharge: HOME | DRG: 377 | End: 2018-06-20
Attending: INTERNAL MEDICINE | Admitting: INTERNAL MEDICINE
Payer: SELF-PAY

## 2018-06-13 DIAGNOSIS — J43.2: ICD-10-CM

## 2018-06-13 DIAGNOSIS — G47.33: ICD-10-CM

## 2018-06-13 DIAGNOSIS — A04.72: ICD-10-CM

## 2018-06-13 DIAGNOSIS — I95.9: ICD-10-CM

## 2018-06-13 DIAGNOSIS — E03.9: ICD-10-CM

## 2018-06-13 DIAGNOSIS — D72.829: ICD-10-CM

## 2018-06-13 DIAGNOSIS — D64.9: ICD-10-CM

## 2018-06-13 DIAGNOSIS — K92.2: Primary | ICD-10-CM

## 2018-06-13 DIAGNOSIS — D35.2: ICD-10-CM

## 2018-06-13 DIAGNOSIS — F17.210: ICD-10-CM

## 2018-06-13 DIAGNOSIS — K85.90: ICD-10-CM

## 2018-06-13 LAB
ABSOLUTE LYMPHOCYTES# (MANUAL): 2.4 10^3/UL (ref 0.5–4.7)
ABSOLUTE MONOCYTES # (MANUAL): 0.7 10^3/UL (ref 0.1–1.4)
ABSOLUTE NEUTROPHILS# (MANUAL): 31.4 10^3/UL (ref 1.7–8.2)
ADD MANUAL DIFF: YES
ALBUMIN SERPL-MCNC: 2.3 G/DL (ref 3.5–5)
ALP SERPL-CCNC: 53 U/L (ref 38–126)
ALT SERPL-CCNC: 28 U/L (ref 21–72)
ANION GAP SERPL CALC-SCNC: 7 MMOL/L (ref 5–19)
APPEARANCE UR: CLEAR
APTT PPP: YELLOW S
AST SERPL-CCNC: 21 U/L (ref 17–59)
BASE EXCESS BLDV CALC-SCNC: -5.4 MMOL/L
BASOPHILS NFR BLD MANUAL: 0 % (ref 0–2)
BILIRUB SERPL-MCNC: < 0.1 MG/DL (ref 0.2–1.3)
BILIRUB UR QL STRIP: NEGATIVE
BUN SERPL-MCNC: 29 MG/DL (ref 7–20)
CALCIUM: 8.4 MG/DL (ref 8.4–10.2)
CHLORIDE SERPL-SCNC: 112 MMOL/L (ref 98–107)
CO2 SERPL-SCNC: 23 MMOL/L (ref 22–30)
EOSINOPHIL NFR BLD MANUAL: 1 % (ref 0–6)
ERYTHROCYTE [DISTWIDTH] IN BLOOD BY AUTOMATED COUNT: 14.2 % (ref 11.5–14)
GLUCOSE SERPL-MCNC: 157 MG/DL (ref 75–110)
GLUCOSE UR STRIP-MCNC: NEGATIVE MG/DL
HCO3 BLDV-SCNC: 20.7 MMOL/L (ref 20–32)
HCT VFR BLD CALC: 13 % (ref 37.9–51)
HGB BLD-MCNC: 4.3 G/DL (ref 13.5–17)
INR PPP: 1.32
KETONES UR STRIP-MCNC: NEGATIVE MG/DL
MCH RBC QN AUTO: 29.5 PG (ref 27–33.4)
MCHC RBC AUTO-ENTMCNC: 33 G/DL (ref 32–36)
MCV RBC AUTO: 89 FL (ref 80–97)
MONOCYTES % (MANUAL): 2 % (ref 3–13)
MYELOCYTES % (MANUAL): 2 %
NEUTS BAND NFR BLD MANUAL: 7 % (ref 3–5)
NITRITE UR QL STRIP: NEGATIVE
NRBC BLD AUTO-RTO: 3 /100 WBC
PCO2 BLDV: 44.4 MMHG (ref 35–63)
PH BLDV: 7.29 [PH] (ref 7.3–7.42)
PH UR STRIP: 6 [PH] (ref 5–9)
PLATELET # BLD: 553 10^3/UL (ref 150–450)
PLATELET CLUMP BLD QL SMEAR: PRESENT
PLATELET LARGE: PRESENT
POLYCHROMASIA BLD QL SMEAR: (no result)
POTASSIUM SERPL-SCNC: 3.8 MMOL/L (ref 3.6–5)
PROMYELOCYTES % (MANUAL): 1 %
PROT SERPL-MCNC: 4.6 G/DL (ref 6.3–8.2)
PROT UR STRIP-MCNC: NEGATIVE MG/DL
PROTHROMBIN TIME: 17 SEC (ref 11.4–15.4)
RBC # BLD AUTO: 1.45 10^6/UL (ref 4.35–5.55)
SEGMENTED NEUTROPHILS % (MAN): 80 % (ref 42–78)
SODIUM SERPL-SCNC: 142 MMOL/L (ref 137–145)
SP GR UR STRIP: 1.03
TOTAL CELLS COUNTED BLD: 100
TOXIC GRANULES BLD QL SMEAR: (no result)
UROBILINOGEN UR-MCNC: NEGATIVE MG/DL (ref ?–2)
VARIANT LYMPHS NFR BLD MANUAL: 7 % (ref 13–45)
WBC # BLD AUTO: 34.9 10^3/UL (ref 4–10.5)

## 2018-06-13 PROCEDURE — 86920 COMPATIBILITY TEST SPIN: CPT

## 2018-06-13 PROCEDURE — P9016 RBC LEUKOCYTES REDUCED: HCPCS

## 2018-06-13 PROCEDURE — 80048 BASIC METABOLIC PNL TOTAL CA: CPT

## 2018-06-13 PROCEDURE — 83735 ASSAY OF MAGNESIUM: CPT

## 2018-06-13 PROCEDURE — 86901 BLOOD TYPING SEROLOGIC RH(D): CPT

## 2018-06-13 PROCEDURE — 05HN33Z INSERTION OF INFUSION DEVICE INTO LEFT INTERNAL JUGULAR VEIN, PERCUTANEOUS APPROACH: ICD-10-PCS | Performed by: EMERGENCY MEDICINE

## 2018-06-13 PROCEDURE — 86850 RBC ANTIBODY SCREEN: CPT

## 2018-06-13 PROCEDURE — 83690 ASSAY OF LIPASE: CPT

## 2018-06-13 PROCEDURE — 74018 RADEX ABDOMEN 1 VIEW: CPT

## 2018-06-13 PROCEDURE — 84439 ASSAY OF FREE THYROXINE: CPT

## 2018-06-13 PROCEDURE — 99292 CRITICAL CARE ADDL 30 MIN: CPT

## 2018-06-13 PROCEDURE — 87493 C DIFF AMPLIFIED PROBE: CPT

## 2018-06-13 PROCEDURE — 94002 VENT MGMT INPAT INIT DAY: CPT

## 2018-06-13 PROCEDURE — 86900 BLOOD TYPING SEROLOGIC ABO: CPT

## 2018-06-13 PROCEDURE — 36430 TRANSFUSION BLD/BLD COMPNT: CPT

## 2018-06-13 PROCEDURE — 85025 COMPLETE CBC W/AUTO DIFF WBC: CPT

## 2018-06-13 PROCEDURE — 82272 OCCULT BLD FECES 1-3 TESTS: CPT

## 2018-06-13 PROCEDURE — 93010 ELECTROCARDIOGRAM REPORT: CPT

## 2018-06-13 PROCEDURE — 80053 COMPREHEN METABOLIC PANEL: CPT

## 2018-06-13 PROCEDURE — 99291 CRITICAL CARE FIRST HOUR: CPT

## 2018-06-13 PROCEDURE — 94640 AIRWAY INHALATION TREATMENT: CPT

## 2018-06-13 PROCEDURE — 93005 ELECTROCARDIOGRAM TRACING: CPT

## 2018-06-13 PROCEDURE — 71275 CT ANGIOGRAPHY CHEST: CPT

## 2018-06-13 PROCEDURE — 87086 URINE CULTURE/COLONY COUNT: CPT

## 2018-06-13 PROCEDURE — S0164 INJECTION PANTROPRAZOLE: HCPCS

## 2018-06-13 PROCEDURE — 81001 URINALYSIS AUTO W/SCOPE: CPT

## 2018-06-13 PROCEDURE — C1751 CATH, INF, PER/CENT/MIDLINE: HCPCS

## 2018-06-13 PROCEDURE — 30233N1 TRANSFUSION OF NONAUTOLOGOUS RED BLOOD CELLS INTO PERIPHERAL VEIN, PERCUTANEOUS APPROACH: ICD-10-PCS | Performed by: EMERGENCY MEDICINE

## 2018-06-13 PROCEDURE — 36415 COLL VENOUS BLD VENIPUNCTURE: CPT

## 2018-06-13 PROCEDURE — 83605 ASSAY OF LACTIC ACID: CPT

## 2018-06-13 PROCEDURE — 84100 ASSAY OF PHOSPHORUS: CPT

## 2018-06-13 PROCEDURE — 87040 BLOOD CULTURE FOR BACTERIA: CPT

## 2018-06-13 PROCEDURE — 94150 VITAL CAPACITY TEST: CPT

## 2018-06-13 PROCEDURE — 94660 CPAP INITIATION&MGMT: CPT

## 2018-06-13 PROCEDURE — 43235 EGD DIAGNOSTIC BRUSH WASH: CPT

## 2018-06-13 PROCEDURE — 84443 ASSAY THYROID STIM HORMONE: CPT

## 2018-06-13 PROCEDURE — 80069 RENAL FUNCTION PANEL: CPT

## 2018-06-13 PROCEDURE — 43255 EGD CONTROL BLEEDING ANY: CPT

## 2018-06-13 PROCEDURE — 76705 ECHO EXAM OF ABDOMEN: CPT

## 2018-06-13 PROCEDURE — 83880 ASSAY OF NATRIURETIC PEPTIDE: CPT

## 2018-06-13 PROCEDURE — 85027 COMPLETE CBC AUTOMATED: CPT

## 2018-06-13 PROCEDURE — 96361 HYDRATE IV INFUSION ADD-ON: CPT

## 2018-06-13 PROCEDURE — 85610 PROTHROMBIN TIME: CPT

## 2018-06-13 PROCEDURE — 43236 UPPR GI SCOPE W/SUBMUC INJ: CPT

## 2018-06-13 PROCEDURE — 71045 X-RAY EXAM CHEST 1 VIEW: CPT

## 2018-06-13 PROCEDURE — 96360 HYDRATION IV INFUSION INIT: CPT

## 2018-06-13 PROCEDURE — P9017 PLASMA 1 DONOR FRZ W/IN 8 HR: HCPCS

## 2018-06-13 PROCEDURE — 82803 BLOOD GASES ANY COMBINATION: CPT

## 2018-06-13 RX ADMIN — SODIUM CHLORIDE AND POTASSIUM CHLORIDE PRN ML: .9; .15 SOLUTION INTRAVENOUS at 17:42

## 2018-06-13 RX ADMIN — Medication SCH MG: at 17:42

## 2018-06-13 RX ADMIN — MORPHINE SULFATE PRN MG: 10 INJECTION INTRAMUSCULAR; INTRAVENOUS; SUBCUTANEOUS at 22:18

## 2018-06-13 RX ADMIN — METRONIDAZOLE SCH ML: 500 INJECTION, SOLUTION INTRAVENOUS at 17:41

## 2018-06-13 RX ADMIN — PANTOPRAZOLE SODIUM PRN MG: 40 INJECTION, POWDER, FOR SOLUTION INTRAVENOUS at 17:41

## 2018-06-13 RX ADMIN — MORPHINE SULFATE PRN MG: 10 INJECTION INTRAMUSCULAR; INTRAVENOUS; SUBCUTANEOUS at 18:02

## 2018-06-13 NOTE — PDOC H&P
History of Present Illness


Admission Date/PCP: 





June 13, 2018


No PCP


Endocrinologist: Dr. Maranda Ray in [place]





Patient complains of: Weakness and hypotension


History of Present Illness: 








The patient is a 47-year-old gentleman with a past medical history of


Pituitary adenoma


Hypothyroidism


Nicotine dependence


COPD


Obstructive sleep apnea








He presented to the hospital on Donna 3 with acute onset abdominal pain and was 

found to have a perforated gastric ulcer which was repaired on the same day.


Postoperatively he remained in the intensive care unit on the ventilator.  On 

postop day 5 he had an oral Gastrografin study done which showed no evidence of 

gastric leak with satisfactory reflux of contrast into the duodenum.


NG tube was removed and he tolerated a diet well.





The patient had persisting leukocytosis and loose stools and was positive for 

C. difficile.


He was discharged with Flagyl.  His leukocytosis was improving and hemoglobin 

was 8.7.





The patient presented back to the hospital today with weakness and systolic 

blood pressure reportedly in the 60s when he checked them at home.


His hemoglobin is 4.3 and WBC count is 34,000.





He was evaluated by the surgical service in the emergency room and they have 

recommended blood transfusions and close monitoring.


CAT scan of the chest was done to rule out pulmonary embolism which was 

negative.


The abdominal images were reviewed by Dr. Au and negative for any bleeding.


3 units of PRBC were ordered of which he has received 2 units.





Home medications:


Synthroid 224 mcg daily


Cabergoline 0.25 mg on Tuesdays and Fridays


Flagyl 500 mg p.o. every 8 hours





The patient denies any chest pain abdominal pain or vomiting.


He has had black stools but no jovanna hematochezia.














Past Medical History


Pulmonary Medical History: Reports: Chronic Obstructive Pulmonary Disease (COPD)


Endocrine Medical History: Reports: Hypothyroidism


Malignancy Medical History: Reports: Other - Pituitary tumor





Past Surgical History


Past Surgical History: Reports: Orthopedic Surgery - orbital fracture, Other - 

Repair of perforated gastric ulcer





Social History


Information Source: Patient


Smoking Status: Current Every Day Smoker


Frequency of Alcohol Use: None


Hx Recreational Drug Use: No


Drugs: None


Hx Prescription Drug Abuse: No





Family History


Family History: Hypertension


Parental Family History Reviewed: Yes


Children Family History Reviewed: Yes


Sibling(s) Family History Reviewed.: Yes





Medication/Allergy


Home Medications: 








Cabergoline [Cabergoline 0.5 mg Tablet] 0.25 mg PO TUFR@1000 06/03/18 


Levothyroxine Sodium [Synthroid 0.112 mg Tablet] 0.224 mg PO DAILY 06/03/18 


Metronidazole [Flagyl 500 mg Tablet] 500 mg PO TID 06/13/18 


Oxycodone HCl/Acetaminophen [Percocet 5-325 mg Tablet] 1 tab PO PRN PRN 06/13/ 18 








Allergies/Adverse Reactions: 


 





No Known Allergies Allergy (Verified 06/13/18 08:22)


 











Review of Systems


Constitutional: PRESENT: weakness.  ABSENT: fever(s)


Eyes: ABSENT: visual disturbances


Ears: ABSENT: hearing changes


Nose, Mouth, and Throat: ABSENT: sore throat


Cardiovascular: ABSENT: chest pain, edema


Respiratory: ABSENT: cough, dyspnea


Gastrointestinal: PRESENT: melena.  ABSENT: abdominal pain, hematemesis, 

hematochezia, vomiting


Genitourinary: ABSENT: dysuria


Musculoskeletal: ABSENT: joint swelling


Integumentary: ABSENT: rash


Neurological: ABSENT: focal weakness


Psychiatric: ABSENT: hallucinations


Endocrine: ABSENT: heat intolerance


Hematologic/Lymphatic: ABSENT: easy bruising


Allergic/Immunologic: ABSENT: seasonal rhinorrhea





Physical Exam


Vital Signs: 


 











Temp Pulse Resp BP Pulse Ox


 


 97.3 F      26 H  110/87 H  99 


 


 06/13/18 15:45     06/13/18 15:46  06/13/18 15:46  06/13/18 15:31








 Intake & Output











 06/12/18 06/13/18 06/14/18





 06:59 06:59 06:59


 


Intake Total   750


 


Balance   750











General appearance: PRESENT: no acute distress, well-developed, well-nourished


Head exam: PRESENT: normocephalic


Ear exam: PRESENT: normal external ear exam


Mouth exam: PRESENT: moist


Neck exam: ABSENT: tracheal deviation


Respiratory exam: PRESENT: rhonchi, symmetrical, unlabored.  ABSENT: wheezes


Cardiovascular exam: PRESENT: RRR


GI/Abdominal exam: PRESENT: normal bowel sounds, soft.  ABSENT: tenderness


Rectal exam: PRESENT: deferred


Gentrourinary exam: ABSENT: indwelling catheter


Extremities exam: ABSENT: pedal edema


Musculoskeletal exam: PRESENT: full ROM


Neurological exam: PRESENT: alert, awake, oriented to person, oriented to place

, oriented to time, oriented to situation


Psychiatric exam: PRESENT: appropriate affect


Skin exam: ABSENT: petechiae





Results


Laboratory Results: 


 





 06/13/18 11:20 





 06/13/18 09:00 





 











  06/13/18 06/13/18 06/13/18





  09:00 09:00 09:00


 


WBC  Cancelled  


 


RBC  Cancelled  


 


Hgb  Cancelled  


 


Hct  Cancelled  


 


MCV  Cancelled  


 


MCH  Cancelled  


 


MCHC  Cancelled  


 


RDW  Cancelled  


 


Plt Count  Cancelled  


 


Seg Neutrophils %  Cancelled  


 


Lymphocytes %  Cancelled  


 


Monocytes %  Cancelled  


 


Eosinophils %  Cancelled  


 


Basophils %  Cancelled  


 


Absolute Neutrophils  Cancelled  


 


Absolute Lymphocytes  Cancelled  


 


Absolute Monocytes  Cancelled  


 


Absolute Eosinophils  Cancelled  


 


Absolute Basophils  Cancelled  


 


VBG pH   


 


VBG pCO2   


 


VBG HCO3   


 


VBG Base Excess   


 


Sodium   142.0 


 


Potassium   3.8 


 


Chloride   112 H 


 


Carbon Dioxide   23 


 


Anion Gap   7 


 


BUN   29 H 


 


Creatinine   0.98 


 


Est GFR ( Amer)   > 60 


 


Est GFR (Non-Af Amer)   > 60 


 


Glucose   157 H 


 


Lactic Acid    1.4


 


Calcium   8.4 


 


Total Bilirubin   < 0.1 L 


 


AST   21 


 


ALT   28 


 


Alkaline Phosphatase   53 


 


Total Protein   4.6 L 


 


Albumin   2.3 L 


 


Lipase   


 


Urine Color   


 


Urine Appearance   


 


Urine pH   


 


Ur Specific Gravity   


 


Urine Protein   


 


Urine Glucose (UA)   


 


Urine Ketones   


 


Urine Blood   


 


Urine Nitrite   


 


Ur Leukocyte Esterase   


 


Urine WBC (Auto)   


 


Urine RBC (Auto)   


 


Stool Occult Blood   


 


Blood Type   


 


Antibody Screen   














  06/13/18 06/13/18 06/13/18





  09:00 09:06 11:20


 


WBC    34.9 H*


 


RBC    1.45 L


 


Hgb    4.3 L* D


 


Hct    13.0 L*


 


MCV    89


 


MCH    29.5


 


MCHC    33.0


 


RDW    14.2 H


 


Plt Count    553 H


 


Seg Neutrophils %    Not Reportable


 


Lymphocytes %    Not Reportable


 


Monocytes %    Not Reportable


 


Eosinophils %    Not Reportable


 


Basophils %    Not Reportable


 


Absolute Neutrophils    Not Reportable


 


Absolute Lymphocytes    Not Reportable


 


Absolute Monocytes    Not Reportable


 


Absolute Eosinophils    Not Reportable


 


Absolute Basophils    Not Reportable


 


VBG pH   


 


VBG pCO2   


 


VBG HCO3   


 


VBG Base Excess   


 


Sodium   


 


Potassium   


 


Chloride   


 


Carbon Dioxide   


 


Anion Gap   


 


BUN   


 


Creatinine   


 


Est GFR ( Amer)   


 


Est GFR (Non-Af Amer)   


 


Glucose   


 


Lactic Acid   


 


Calcium   


 


Total Bilirubin   


 


AST   


 


ALT   


 


Alkaline Phosphatase   


 


Total Protein   


 


Albumin   


 


Lipase  1084.2 H  


 


Urine Color   


 


Urine Appearance   


 


Urine pH   


 


Ur Specific Gravity   


 


Urine Protein   


 


Urine Glucose (UA)   


 


Urine Ketones   


 


Urine Blood   


 


Urine Nitrite   


 


Ur Leukocyte Esterase   


 


Urine WBC (Auto)   


 


Urine RBC (Auto)   


 


Stool Occult Blood   


 


Blood Type   O POSITIVE 


 


Antibody Screen   NEGATIVE 














  06/13/18 06/13/18 06/13/18





  12:24 13:25 13:25


 


WBC   


 


RBC   


 


Hgb   


 


Hct   


 


MCV   


 


MCH   


 


MCHC   


 


RDW   


 


Plt Count   


 


Seg Neutrophils %   


 


Lymphocytes %   


 


Monocytes %   


 


Eosinophils %   


 


Basophils %   


 


Absolute Neutrophils   


 


Absolute Lymphocytes   


 


Absolute Monocytes   


 


Absolute Eosinophils   


 


Absolute Basophils   


 


VBG pH  7.29 L  


 


VBG pCO2  44.4  


 


VBG HCO3  20.7  


 


VBG Base Excess  -5.4  


 


Sodium   


 


Potassium   


 


Chloride   


 


Carbon Dioxide   


 


Anion Gap   


 


BUN   


 


Creatinine   


 


Est GFR ( Amer)   


 


Est GFR (Non-Af Amer)   


 


Glucose   


 


Lactic Acid   


 


Calcium   


 


Total Bilirubin   


 


AST   


 


ALT   


 


Alkaline Phosphatase   


 


Total Protein   


 


Albumin   


 


Lipase   


 


Urine Color   YELLOW 


 


Urine Appearance   CLEAR 


 


Urine pH   6.0 


 


Ur Specific Gravity   1.026 


 


Urine Protein   NEGATIVE 


 


Urine Glucose (UA)   NEGATIVE 


 


Urine Ketones   NEGATIVE 


 


Urine Blood   NEGATIVE 


 


Urine Nitrite   NEGATIVE 


 


Ur Leukocyte Esterase   TRACE H 


 


Urine WBC (Auto)   5 


 


Urine RBC (Auto)   1 


 


Stool Occult Blood    POSITIVE


 


Blood Type   


 


Antibody Screen   











Impressions: 


 





Chest/Abdomen CTA  06/13/18 09:15


IMPRESSION:  No CT angio evidence of acute pulmonary emboli or thoracic aortic 

dissection


Since the CT exam 6/3/2018, patient has developed a trace left pleural effusion

, bibasilar atelectasis, and retroperitoneal stranding around the pancreatic 

tail which could indicate pancreatitis


 








Chest X-Ray  06/13/18 11:03


IMPRESSION:  No pneumothorax post left jugular line placement, with line tip in 

the superior vena cava.


Stable bandlike atelectasis left lung base


 














Assessment & Plan





- Diagnosis


(1) Severe anemia


Is this a current diagnosis for this admission?: Yes   


Plan: 


3 units of packed red blood cells ordered.


Continue to monitor hemoglobin and transfuse as needed to keep hemoglobin 8 or 

above.








(2) Hypotension


Is this a current diagnosis for this admission?: Yes   


Plan: 


Monitor closely in the ICU.


IV fluids blood transfusions.











(3) Upper GI bleed


Is this a current diagnosis for this admission?: Yes   


Plan: 


N.p.o. except for ice chips.


Surgical consult.


IV Protonix drip











(4) C. difficile diarrhea


Is this a current diagnosis for this admission?: Yes   


Plan: 


IV Flagyl and p.o. vancomycin.








(5) Hypothyroidism


Is this a current diagnosis for this admission?: Yes   


Plan: 


Continue Synthroid








(6) Pituitary tumor


Is this a current diagnosis for this admission?: Yes   


Plan: 


Continue cabergoline








(7) DVT prophylaxis


Is this a current diagnosis for this admission?: Yes   


Plan: 


Sequential teds.








(8) COPD (chronic obstructive pulmonary disease) with emphysema


Qualifiers: 


   Emphysema type: centrilobular   Qualified Code(s): J43.2 - Centrilobular 

emphysema   


Is this a current diagnosis for this admission?: Yes   


Plan: 


Nebs as needed.  Not in exacerbation.








- Time


Time Spent: Greater than 70 Minutes





- Inpatient Certification


Medical Necessity: Need Close Monitoring Due to Risk of Patient Decompensation, 

Risk of Complication if Not Cared For in Hospital

## 2018-06-13 NOTE — RADIOLOGY REPORT (SQ)
EXAM DESCRIPTION:  CTA CHEST



COMPLETED DATE/TIME:  6/13/2018 1:25 pm



REASON FOR STUDY:  SOB, tachycardia, tachypnea, recent ICU stay



COMPARISON:  CT abdomen and pelvis 6/3/2018

CT chest 4/22/2017



TECHNIQUE:  CT scan of the chest performed using helical scanning technique with dynamic intravenous 
contrast injection.  Images reviewed with lung, soft tissue and bone windows.  Reconstructed coronal 
and sagittal MPR images reviewed.

Additional 3 dimensional post-processing performed to develop Maximal Intensity Projection images (MI
P).  All images stored on PACS.

All CT scanners at this facility use dose modulation, iterative reconstruction, and/or weight based d
osing when appropriate to reduce radiation dose to as low as reasonably achievable (ALARA).

[facility]: Dose Right  [facility]: CareDose    [facility]: Dose Right    [facility]: Teradose 4D    [facility]: Smart Technologies



CONTRAST TYPE AND DOSE:  contrast/concentration: Isovue 370.00 mg/ml; Total Contrast Delivered: 83.0 
ml; Total Saline Delivered: 90.0 ml

Contrast bolus adequate for pulmonary arteries and aorta.



RENAL FUNCTION:  Creatinine 0.84



RADIATION DOSE:  CT Rad equipment meets quality standard of care and radiation dose reduction techniq
ues were employed. CTDIvol: 13.2 - 35.6 mGy. DLP: 1411 mGy-cm. .



LIMITATIONS:  None.



FINDINGS:  LUNGS AND PLEURA: No fluffy alveolar infiltrates worrisome for pneumonia or pulmonary robby
a.  There is minimal bibasilar bandlike atelectasis.  Trace left pleural fluid.  These findings are n
ew compared to CT abdomen pelvis 6/3/2018.  No pneumothorax.  Airways are patent.

AORTA AND GREAT VESSELS: No thoracic aortic aneurysm or aortic dissection.  Direct origin left verteb
ral artery off the aorta, an anatomic variant.  Left jugular central line tip superior vena cava.

HEART: Small pericardial effusion, new compared to 6/3/2018 No significant coronary artery calcificat
ions.

PULMONARY ARTERIES: No emboli visualized in the main pulmonary arteries or the segmental branches.

HILAR AND MEDIASTINAL STRUCTURES: No identified masses or abnormal nodes.

HARDWARE: Left jugular central line tip superior vena cava

UPPER ABDOMEN: Go patient had recent repair of a perforated gastric ulcer.  There is no lesser sac fl
uid or upper abdominal free fluid.  No upper abdominal free air.  Mild inflammation along the tail of
 the pancreas, question mild pancreatitis.  Multiple bilateral intrarenal nonobstructive kidney stone
s less than 5 mm in size.

THYROID AND OTHER SOFT TISSUES: No masses.  No adenopathy.

BONES: Multiple old healed bilateral posterior rib fractures.  Diffuse thoracic spondylotic change.

3D MIPS: Confirm above findings.

OTHER: Findings discussed with Dr. Villarreal



IMPRESSION:  No CT angio evidence of acute pulmonary emboli or thoracic aortic dissection

Since the CT exam 6/3/2018, patient has developed a trace left pleural effusion, bibasilar atelectasi
s, and retroperitoneal stranding around the pancreatic tail which could indicate pancreatitis



COMMENT:  Quality ID # [id number]: Final reports with documentation of one or more dose reduction techniques
 (e.g., Automated exposure control, adjustment of the mA and/or kV according to patient size, use of 
iterative reconstruction technique)



TECHNICAL DOCUMENTATION:  JOB ID:  [id number]

 2011 [employer]- All Rights Reserved



Reading location - IP/workstation name: [email or web address]

## 2018-06-13 NOTE — ER DOCUMENT REPORT
ED General





- General


Chief Complaint: General Weakness


Stated Complaint: BLOOD PRESSURE ISSUES


Time Seen by Provider: 06/13/18 08:37


TRAVEL OUTSIDE OF THE [place] IN LAST 30 DAYS: No





- Related Data


Allergies/Adverse Reactions: 


 





No Known Allergies Allergy (Verified 06/13/18 08:22)


 











Past Medical History





- Social History


Family History: None


Pulmonary Medical History: Reports: Hx COPD


Endocrine Medical History: Reports: Hx Hypothyroidism


Renal/ Medical History: Denies: Hx Peritoneal Dialysis


Psychiatric Medical History: 


   Denies: Hx Depression





- Immunizations


Hx Diphtheria, Pertussis, Tetanus Vaccination: No





Discharge





- Discharge


Referrals: 


LOCALMD,NO [Primary Care Provider] - Follow up as needed

## 2018-06-13 NOTE — ER DOCUMENT REPORT
ED General





- General


Chief Complaint: General Weakness


Stated Complaint: BLOOD PRESSURE ISSUES


Time Seen by Provider: 06/13/18 08:37


Notes: 


The patient is a 47-year-old male, past medical history recently perforated 

ulcer that required surgery and ICU stay with discharge 1 week ago, C. difficile

, pituitary adenoma, hypothyroidism, presents with 1 day of feeling weak and 

lightheaded.  He took his blood pressure home this morning was 64/35.  Patient 

is having copious amounts of watery diarrhea and is taking his Flagyl.  Patient 

is also having some shortness of breath and mild leg swelling.  He denies 

abdominal pain, fevers, back pain, chest pain, hemoptysis, cough, calf swelling

, blood in his stool, urinary symptoms, headache or rash.


TRAVEL OUTSIDE OF THE [place] IN LAST 30 DAYS: No





- Related Data


Allergies/Adverse Reactions: 


 





No Known Allergies Allergy (Verified 06/13/18 08:22)


 











Past Medical History





- General


Information source: Patient





- Social History


Smoking Status: Former Smoker


Chew tobacco use (# tins/day): No


Frequency of alcohol use: None


Drug Abuse: None


Family History: None


Patient has suicidal ideation: No


Patient has homicidal ideation: No


Pulmonary Medical History: Reports: Hx COPD


Endocrine Medical History: Reports: Hx Hypothyroidism


Renal/ Medical History: Denies: Hx Peritoneal Dialysis


Psychiatric Medical History: 


   Denies: Hx Depression


Past Surgical History: Reports: Hx Abdominal Surgery - perforated ulcer repaired

, Hx Orthopedic Surgery - orbital fracture





- Immunizations


Hx Diphtheria, Pertussis, Tetanus Vaccination: No





Review of Systems





- Review of Systems


Notes: 


REVIEW OF SYSTEMS:


CONSTITUTIONAL: -fevers, -chills


EENT: -eye pain, -difficulty swallowing, -nasal congestion


CARDIOVASCULAR: -chest pain, +near syncope.


RESPIRATORY: -cough, +SOB


GASTROINTESTINAL: -abdominal pain, -nausea, -vomiting, -diarrhea


GENITOURINARY: -dysuria, -hematuria


MUSCULOSKELETAL: -back pain, -neck pain


SKIN: -rash or skin lesions.


HEMATOLOGIC: -easy bruising or bleeding.


LYMPHATIC: -swollen, enlarged glands.


NEUROLOGICAL: -altered mental status or loss of consciousness, -headache, -

neurologic symptoms


PSYCHIATRIC: -anxiety, -depression.


ALL OTHER SYSTEMS REVIEWED AND NEGATIVE.





Physical Exam





- Vital signs


Vitals: 


 











Resp


 


 15 


 


 06/13/18 08:30














- Notes


Notes: 


PHYSICAL EXAMINATION:


GENERAL: In no acute distress.


HEAD: Atraumatic, normocephalic.


EYES: Pupils equal round and reactive to light, extraocular movements intact, 

sclera anicteric, conjunctiva are normal.


ENT: nares patent, oropharynx clear without exudates.  Moist mucous membranes.


NECK: Normal range of motion, supple without lymphadenopathy


LUNGS: Breath sounds clear to auscultation bilaterally and equal.  No wheezes 

rales or rhonchi.


HEART: Regular rate and rhythm without murmurs


ABDOMEN: Soft, nontender, normoactive bowel sounds. Well-healing surgical wound 

without drainage or surrounding erythema. No guarding, no rebound.  No masses 

appreciated.


EXTREMITIES: Normal range of motion, no pitting or edema.  No cyanosis.


NEUROLOGICAL: Cranial nerves grossly intact.  Normal speech, normal gait.  

Normal sensory and motor exams.


PSYCH: Normal mood, normal affect.


SKIN: Warm, Dry, normal turgor, no rashes or lesions noted.





Course





- Re-evaluation


Re-evalutation: 


Patient with hemoglobin of 4, which was confirmed on repeat blood work.  He is 

also tachycardic and hypotensive.  Venous access difficult to obtain and with 

his hypertension, patient was consented for a central line, which was 

successfully placed in the left IJ.  With his recent ICU stay and mild tachypnea

, concerned about PE, but no PE found on CTA.  Patient's white count is 35.6, 

which is consistent with his known C. difficile infection, which is taking his 

Flagyl.  Patient's abdomen is soft and nontender.  He does not appear to have 

any intraabdominal pathology or surgical site infection.  Patient was having 

mild epigastric pain yesterday and may have evidence of mild pancreatitis.  

Patient requires inpatient admission for further evaluation and monitoring.





06/13/18 16:04 Spoke to Dr. Au (Surgeon), who reviewed the CT scan and 

examined the patient.  No evidence of retroperitoneal bleed or intra-abdominal 

bleeding.  Spoke to Dr. Urbina who has accepted the patient to the ICU.  Patient's 

blood pressure remained tenuous, but MAPs are over 65. He already has a central 

line, so will begin Levophed is his MAPs go below 65.





- Vital Signs


Vital signs: 


 











Temp Pulse Resp BP Pulse Ox


 


 97.3 F      26 H  110/87 H  99 


 


 06/13/18 15:45     06/13/18 15:46  06/13/18 15:46  06/13/18 15:31














- Laboratory


Result Diagrams: 


 06/13/18 11:20





 06/13/18 09:00


Laboratory results interpreted by me: 


 











  06/13/18 06/13/18 06/13/18





  09:00 09:00 09:00


 


WBC   


 


RBC   


 


Hgb   


 


Hct   


 


RDW   


 


Plt Count   


 


Seg Neuts % (Manual)   


 


Band Neutrophils %   


 


Lymphocytes % (Manual)   


 


Monocytes % (Manual)   


 


Myelocytes %   


 


Promyelocytes %   


 


Abs Neuts (Manual)   


 


PT  17.0 H  


 


VBG pH   


 


Chloride   112 H 


 


BUN   29 H 


 


Glucose   157 H 


 


Total Bilirubin   < 0.1 L 


 


Total Protein   4.6 L 


 


Albumin   2.3 L 


 


Lipase    1084.2 H


 


Ur Leukocyte Esterase   


 


Crossmatch   














  06/13/18 06/13/18 06/13/18





  09:06 11:20 12:24


 


WBC   34.9 H* 


 


RBC   1.45 L 


 


Hgb   4.3 L* D 


 


Hct   13.0 L* 


 


RDW   14.2 H 


 


Plt Count   553 H 


 


Seg Neuts % (Manual)   80 H 


 


Band Neutrophils %   7 H 


 


Lymphocytes % (Manual)   7 L 


 


Monocytes % (Manual)   2 L 


 


Myelocytes %   2 H 


 


Promyelocytes %   1 H 


 


Abs Neuts (Manual)   31.4 H 


 


PT   


 


VBG pH    7.29 L


 


Chloride   


 


BUN   


 


Glucose   


 


Total Bilirubin   


 


Total Protein   


 


Albumin   


 


Lipase   


 


Ur Leukocyte Esterase   


 


Crossmatch  See Detail  














  06/13/18





  13:25


 


WBC 


 


RBC 


 


Hgb 


 


Hct 


 


RDW 


 


Plt Count 


 


Seg Neuts % (Manual) 


 


Band Neutrophils % 


 


Lymphocytes % (Manual) 


 


Monocytes % (Manual) 


 


Myelocytes % 


 


Promyelocytes % 


 


Abs Neuts (Manual) 


 


PT 


 


VBG pH 


 


Chloride 


 


BUN 


 


Glucose 


 


Total Bilirubin 


 


Total Protein 


 


Albumin 


 


Lipase 


 


Ur Leukocyte Esterase  TRACE H


 


Crossmatch 














- Diagnostic Test


Radiology reviewed: Image reviewed, Reports reviewed


Radiology results interpreted by me: 


CTA Chest: No CT angio evidence of acute pulmonary emboli or thoracic aortic 

dissection. Since the CT exam 6/3/2018, patient has developed a trace left 

pleural effusion, bibasilar atelectasis, and retroperitoneal stranding around 

the pancreatic tail which could indicate pancreatitis.





- EKG Interpretation by Me


EKG shows normal: Sinus rhythm, Axis, Intervals, QRS Complexes, ST-T Waves


Rate: Tachycardia


When compared to previous EKG there are: No significant change





Procedures





- Central Line


  ** Left Internal jugular


Time completed: 12:02


Consent obtained: Yes


Central line pre-insertion: Sterile PPE donned, Chloraprep applied, Sterile 

drapes applied


Central line size (Fr.): 20


Central line lumen type: Triple


Anesthetic type: 1% Lidocaine


mL's of anesthesia: 5


Ultrasound guided: Yes


CM at insertion site: 17


Line secured with sutures: Yes


Central line post-insertion: Blood return from lumens, Biopatch applied, Sutured

, Sterile dressing applied, Position confirmed w/ CXR


Number of attempts: 1


Complications: No





Critical Care Note





- Critical Care Note


Total time excluding time spent on procedures (mins): 85





Discharge





- Discharge


Clinical Impression: 


 Clostridium difficile colitis





Anemia


Qualifiers:


 Anemia type: unspecified type Qualified Code(s): D64.9 - Anemia, unspecified





Hypotension


Qualifiers:


 Hypotension type: unspecified hypotension type Qualified Code(s): I95.9 - 

Hypotension, unspecified





Pancreatitis


Qualifiers:


 Chronicity: acute Pancreatitis type: unspecified pancreatitis type Acute 

pancreatitis complication: unspecified Qualified Code(s): K85.90 - Acute 

pancreatitis without necrosis or infection, unspecified





Leukocytosis


Qualifiers:


 Leukocytosis type: unspecified Qualified Code(s): D72.829 - Elevated white 

blood cell count, unspecified





Condition: Serious


Disposition: ADMITTED AS INPATIENT


Admitting Provider: Hospitalist - Dr. Ciara Tang


Unit Admitted: ICU


Referrals: 


LOCALMD,NO [NO LOCAL MD] - Follow up as needed

## 2018-06-13 NOTE — RADIOLOGY REPORT (SQ)
EXAM DESCRIPTION:  CHEST SINGLE VIEW



COMPLETED DATE/TIME:  6/13/2018 11:22 am



REASON FOR STUDY:  central line placement



COMPARISON:  AP chest 6/13/2018 at 0946 hours

Chest films 6/10/2018, 6/8/2018, 6/7/2018

CT chest 4/22/2017



EXAM PARAMETERS:  NUMBER OF VIEWS: One view.

TECHNIQUE: Single frontal radiographic view of the chest acquired.

RADIATION DOSE: NA

LIMITATIONS: None.



FINDINGS:  LUNGS AND PLEURA: Minimal bandlike atelectasis at the left lung base.  Lungs are otherwise
 clear.

No pneumothorax post left jugular central line placement.  No gross pleural effusions.

MEDIASTINUM AND HILAR STRUCTURES: No masses.  Contour normal.

HEART AND VASCULAR STRUCTURES: Heart normal in size.  Normal vasculature.

BONES: No acute findings.

HARDWARE: Interval placement of a left jugular central line with the tip in the superior vena cava

OTHER: No other significant finding.



IMPRESSION:  No pneumothorax post left jugular line placement, with line tip in the superior vena cav
a.

Stable bandlike atelectasis left lung base



TECHNICAL DOCUMENTATION:  JOB ID:  [id number]

 2011 [employer]- All Rights Reserved



Reading location - IP/workstation name: [email or web address]

## 2018-06-13 NOTE — EKG REPORT
SEVERITY:- BORDERLINE ECG -

SINUS TACHYCARDIA

BORDERLINE RIGHT AXIS DEVIATION

BORDERLINE PROLONGED QT INTERVAL

:

Confirmed by: Fito Sifuentes MD 13-Jun-2018 13:19:20

## 2018-06-13 NOTE — RADIOLOGY REPORT (SQ)
EXAM DESCRIPTION:  CHEST SINGLE VIEW



COMPLETED DATE/TIME:  6/13/2018 9:59 am



REASON FOR STUDY:  SOB



COMPARISON:  6/10/2018



EXAM PARAMETERS:  NUMBER OF VIEWS: One view.

TECHNIQUE: Single frontal radiographic view of the chest acquired.

RADIATION DOSE: NA

LIMITATIONS: None.



FINDINGS:  LUNGS AND PLEURA: Continued patchy change at the left lung base with small left pleural ef
fusion.

MEDIASTINUM AND HILAR STRUCTURES: Stable.

HEART AND VASCULAR STRUCTURES: Heart stable.  No overt CHF.

BONES: No acute findings.

HARDWARE: None in the chest.

OTHER: No other significant finding.



IMPRESSION:  Stable appearance, with patchy changes left base and small left pleural effusion.



TECHNICAL DOCUMENTATION:  JOB ID:  [id number]

 2011 [employer]- All Rights Reserved



Reading location - IP/workstation name: [email or web address]

## 2018-06-14 LAB
ALBUMIN SERPL-MCNC: 2.2 G/DL (ref 3.5–5)
ALP SERPL-CCNC: 47 U/L (ref 38–126)
ALT SERPL-CCNC: 34 U/L (ref 21–72)
ANION GAP SERPL CALC-SCNC: 5 MMOL/L (ref 5–19)
ANION GAP SERPL CALC-SCNC: 6 MMOL/L (ref 5–19)
AST SERPL-CCNC: 20 U/L (ref 17–59)
BILIRUB DIRECT SERPL-MCNC: 0.1 MG/DL (ref 0–0.4)
BILIRUB SERPL-MCNC: 0.2 MG/DL (ref 0.2–1.3)
BUN SERPL-MCNC: 20 MG/DL (ref 7–20)
BUN SERPL-MCNC: 26 MG/DL (ref 7–20)
CALCIUM: 7.9 MG/DL (ref 8.4–10.2)
CALCIUM: 8.1 MG/DL (ref 8.4–10.2)
CHLORIDE SERPL-SCNC: 114 MMOL/L (ref 98–107)
CHLORIDE SERPL-SCNC: 115 MMOL/L (ref 98–107)
CO2 SERPL-SCNC: 23 MMOL/L (ref 22–30)
CO2 SERPL-SCNC: 27 MMOL/L (ref 22–30)
ERYTHROCYTE [DISTWIDTH] IN BLOOD BY AUTOMATED COUNT: 14.3 % (ref 11.5–14)
ERYTHROCYTE [DISTWIDTH] IN BLOOD BY AUTOMATED COUNT: 14.4 % (ref 11.5–14)
ERYTHROCYTE [DISTWIDTH] IN BLOOD BY AUTOMATED COUNT: 16 % (ref 11.5–14)
GLUCOSE SERPL-MCNC: 109 MG/DL (ref 75–110)
GLUCOSE SERPL-MCNC: 136 MG/DL (ref 75–110)
HCT VFR BLD CALC: 18.5 % (ref 37.9–51)
HCT VFR BLD CALC: 19.9 % (ref 37.9–51)
HCT VFR BLD CALC: 22.4 % (ref 37.9–51)
HGB BLD-MCNC: 6.1 G/DL (ref 13.5–17)
HGB BLD-MCNC: 6.7 G/DL (ref 13.5–17)
HGB BLD-MCNC: 7.4 G/DL (ref 13.5–17)
MCH RBC QN AUTO: 28.4 PG (ref 27–33.4)
MCH RBC QN AUTO: 29.1 PG (ref 27–33.4)
MCH RBC QN AUTO: 29.4 PG (ref 27–33.4)
MCHC RBC AUTO-ENTMCNC: 33.2 G/DL (ref 32–36)
MCHC RBC AUTO-ENTMCNC: 33.3 G/DL (ref 32–36)
MCHC RBC AUTO-ENTMCNC: 33.7 G/DL (ref 32–36)
MCV RBC AUTO: 85 FL (ref 80–97)
MCV RBC AUTO: 88 FL (ref 80–97)
MCV RBC AUTO: 88 FL (ref 80–97)
PATH REV BLD -IMP: (no result)
PHOSPHATE SERPL-MCNC: 4.2 MG/DL (ref 2.5–4.5)
PLATELET # BLD: 382 10^3/UL (ref 150–450)
PLATELET # BLD: 391 10^3/UL (ref 150–450)
PLATELET # BLD: 397 10^3/UL (ref 150–450)
POTASSIUM SERPL-SCNC: 4.2 MMOL/L (ref 3.6–5)
POTASSIUM SERPL-SCNC: 4.3 MMOL/L (ref 3.6–5)
PROT SERPL-MCNC: 4.4 G/DL (ref 6.3–8.2)
RBC # BLD AUTO: 2.11 10^6/UL (ref 4.35–5.55)
RBC # BLD AUTO: 2.28 10^6/UL (ref 4.35–5.55)
RBC # BLD AUTO: 2.62 10^6/UL (ref 4.35–5.55)
SODIUM SERPL-SCNC: 144.4 MMOL/L (ref 137–145)
SODIUM SERPL-SCNC: 145.8 MMOL/L (ref 137–145)
WBC # BLD AUTO: 24.4 10^3/UL (ref 4–10.5)
WBC # BLD AUTO: 28.4 10^3/UL (ref 4–10.5)
WBC # BLD AUTO: 29.1 10^3/UL (ref 4–10.5)

## 2018-06-14 PROCEDURE — 0W3P8ZZ CONTROL BLEEDING IN GASTROINTESTINAL TRACT, VIA NATURAL OR ARTIFICIAL OPENING ENDOSCOPIC: ICD-10-PCS | Performed by: SURGERY

## 2018-06-14 PROCEDURE — 3E0G8GC INTRODUCTION OF OTHER THERAPEUTIC SUBSTANCE INTO UPPER GI, VIA NATURAL OR ARTIFICIAL OPENING ENDOSCOPIC: ICD-10-PCS | Performed by: SURGERY

## 2018-06-14 PROCEDURE — 30233K1 TRANSFUSION OF NONAUTOLOGOUS FROZEN PLASMA INTO PERIPHERAL VEIN, PERCUTANEOUS APPROACH: ICD-10-PCS | Performed by: INTERNAL MEDICINE

## 2018-06-14 PROCEDURE — 0DC68ZZ EXTIRPATION OF MATTER FROM STOMACH, VIA NATURAL OR ARTIFICIAL OPENING ENDOSCOPIC: ICD-10-PCS | Performed by: SURGERY

## 2018-06-14 PROCEDURE — 30233N1 TRANSFUSION OF NONAUTOLOGOUS RED BLOOD CELLS INTO PERIPHERAL VEIN, PERCUTANEOUS APPROACH: ICD-10-PCS | Performed by: INTERNAL MEDICINE

## 2018-06-14 RX ADMIN — PANTOPRAZOLE SODIUM PRN MG: 40 INJECTION, POWDER, FOR SOLUTION INTRAVENOUS at 21:18

## 2018-06-14 RX ADMIN — FENTANYL CITRATE PRN MCG: 50 INJECTION INTRAMUSCULAR; INTRAVENOUS at 21:24

## 2018-06-14 RX ADMIN — Medication SCH MG: at 05:42

## 2018-06-14 RX ADMIN — SODIUM CHLORIDE AND POTASSIUM CHLORIDE PRN ML: .9; .15 SOLUTION INTRAVENOUS at 17:30

## 2018-06-14 RX ADMIN — Medication SCH MG: at 13:44

## 2018-06-14 RX ADMIN — Medication SCH ML: at 13:45

## 2018-06-14 RX ADMIN — MORPHINE SULFATE PRN MG: 10 INJECTION INTRAMUSCULAR; INTRAVENOUS; SUBCUTANEOUS at 06:02

## 2018-06-14 RX ADMIN — OCTREOTIDE ACETATE PRN MCG: 200 INJECTION, SOLUTION INTRAVENOUS; SUBCUTANEOUS at 14:57

## 2018-06-14 RX ADMIN — FENTANYL CITRATE PRN MCG: 50 INJECTION INTRAMUSCULAR; INTRAVENOUS at 15:29

## 2018-06-14 RX ADMIN — METRONIDAZOLE SCH ML: 500 INJECTION, SOLUTION INTRAVENOUS at 05:41

## 2018-06-14 RX ADMIN — MORPHINE SULFATE PRN MG: 10 INJECTION INTRAMUSCULAR; INTRAVENOUS; SUBCUTANEOUS at 10:34

## 2018-06-14 RX ADMIN — PANTOPRAZOLE SODIUM PRN MG: 40 INJECTION, POWDER, FOR SOLUTION INTRAVENOUS at 12:23

## 2018-06-14 RX ADMIN — OCTREOTIDE ACETATE PRN MCG: 200 INJECTION, SOLUTION INTRAVENOUS; SUBCUTANEOUS at 22:46

## 2018-06-14 RX ADMIN — Medication SCH ML: at 05:43

## 2018-06-14 RX ADMIN — METRONIDAZOLE SCH ML: 500 INJECTION, SOLUTION INTRAVENOUS at 03:00

## 2018-06-14 RX ADMIN — Medication SCH MG: at 03:00

## 2018-06-14 RX ADMIN — SODIUM CHLORIDE AND POTASSIUM CHLORIDE PRN ML: .9; .15 SOLUTION INTRAVENOUS at 03:23

## 2018-06-14 RX ADMIN — METRONIDAZOLE SCH ML: 500 INJECTION, SOLUTION INTRAVENOUS at 12:24

## 2018-06-14 RX ADMIN — Medication SCH ML: at 21:18

## 2018-06-14 RX ADMIN — METRONIDAZOLE SCH ML: 500 INJECTION, SOLUTION INTRAVENOUS at 18:28

## 2018-06-14 RX ADMIN — Medication SCH ML: at 03:00

## 2018-06-14 NOTE — PDOC PROGRESS REPORT
Subjective


Progress Note for:: 06/14/18


Subjective:: 








The patient is a 47-year-old gentleman with a past medical history of


Pituitary adenoma


Hypothyroidism


Nicotine dependence


COPD


Obstructive sleep apnea





He was discharged on June 11 after perforated gastric ulcer repair.


During that hospital stay he was also treated for C. difficile diarrhea.





The patient presented back to the hospital on June 13 with melena and severe 

anemia.


He received 3 units of packed red blood cells and was seen in consultation by 

the surgical service.





He developed some hematemesis overnight and an endoscopy was done by Dr. Au.


He was intubated during the procedure and was extubated early this morning.


He was transfused 2 more units of blood and was also given FFP


A large clot was found in the cardia of the stomach and was removed.  The 

stomach was irrigated suctioned and inspected.


In the antrum at the area of previous repair there was a small visible vessel 

with clot around it which was irrigated and epinephrine was injected 

submucosally and then cauterized.


Resolution clips were placed proximally and distally which halted the bleeding.


No bleeding was seen in the first or second part of the duodenum.





He is sitting up in bed.  Is complaining of some sore throat.


No abdominal pain or nausea.











Reason For Visit: 


GI BLEED ANEMIA,HYPOTENSION








Physical Exam


Vital Signs: 


 











Temp Pulse Resp BP Pulse Ox


 


 99.5 F   93   18   118/73   97 


 


 06/14/18 06:11  06/14/18 04:59  06/14/18 06:45  06/14/18 06:11  06/14/18 06:11








 Intake & Output











 06/13/18 06/14/18 06/15/18





 06:59 06:59 06:59


 


Intake Total  350 300


 


Output Total  800 


 


Balance  -450 300


 


Weight  118.6 kg 











General appearance: PRESENT: no acute distress, well-developed, well-nourished


Respiratory exam: PRESENT: symmetrical, unlabored.  ABSENT: wheezes


Cardiovascular exam: PRESENT: RRR


GI/Abdominal exam: PRESENT: normal bowel sounds, soft, tenderness


Rectal exam: PRESENT: deferred


Extremities exam: ABSENT: pedal edema


Neurological exam: PRESENT: alert, awake, oriented to person, oriented to place

, oriented to time, oriented to situation


Psychiatric exam: PRESENT: appropriate affect





Results


Laboratory Results: 


 





 06/14/18 06:56 





 06/14/18 06:56 





 











  06/14/18 06/14/18





  06:56 06:56


 


WBC  29.1 H 


 


RBC  2.28 L 


 


Hgb  6.7 L D 


 


Hct  19.9 L 


 


MCV  88 


 


MCH  29.4 


 


MCHC  33.7 


 


RDW  14.3 H 


 


Plt Count  397 


 


Sodium   144.4


 


Potassium   4.3


 


Chloride   115 H


 


Carbon Dioxide   23


 


Anion Gap   6


 


BUN   26 H


 


Creatinine   0.92


 


Est GFR ( Amer)   > 60


 


Est GFR (Non-Af Amer)   > 60


 


Glucose   136 H


 


Calcium   7.9 L


 


Phosphorus   4.2


 


Magnesium   2.1


 


Total Bilirubin   0.2


 


AST   20


 


ALT   34


 


Alkaline Phosphatase   47


 


Total Protein   4.4 L


 


Albumin   2.2 L











Impressions: 


 





Chest/Abdomen CTA  06/13/18 09:15


IMPRESSION:  No CT angio evidence of acute pulmonary emboli or thoracic aortic 

dissection


Since the CT exam 6/3/2018, patient has developed a trace left pleural effusion

, bibasilar atelectasis, and retroperitoneal stranding around the pancreatic 

tail which could indicate pancreatitis


 








Chest X-Ray  06/13/18 11:03


IMPRESSION:  No pneumothorax post left jugular line placement, with line tip in 

the superior vena cava.


Stable bandlike atelectasis left lung base


 














Assessment & Plan





- Diagnosis


(1) Severe anemia


Is this a current diagnosis for this admission?: Yes   


Plan: 


He has received a total of 5 units of packed red blood cells and 2 units of FFP 

since admission.


Continue to monitor hemoglobin and transfuse as needed to keep hemoglobin 8 or 

above.








(2) Hypotension


Is this a current diagnosis for this admission?: Yes   


Plan: 


Resolved.  Continue to monitor.











(3) Upper GI bleed


Is this a current diagnosis for this admission?: Yes   


Plan: 


Bleeding vessel in the antrum status post epinephrine injections cautery and 

clips.


N.p.o. except for ice chips.


Management per surgery service.


IV Protonix drip.











(4) C. difficile diarrhea


Is this a current diagnosis for this admission?: Yes   


Plan: 


IV Flagyl and p.o. vancomycin.








(5) Hypothyroidism


Is this a current diagnosis for this admission?: Yes   


Plan: 


Continue Synthroid








(6) Pituitary tumor


Is this a current diagnosis for this admission?: Yes   


Plan: 


Continue cabergoline








(7) DVT prophylaxis


Is this a current diagnosis for this admission?: Yes   


Plan: 


Sequential teds.








(8) COPD (chronic obstructive pulmonary disease) with emphysema


Qualifiers: 


   Emphysema type: centrilobular   Qualified Code(s): J43.2 - Centrilobular 

emphysema   


Is this a current diagnosis for this admission?: Yes   


Plan: 


Nebs as needed.  Not in exacerbation.








- Time


Time Spent with patient: 35 or more minutes

## 2018-06-14 NOTE — OPERATIVE REPORT
Operative Report


DATE OF SURGERY: 06/14/18


PREOPERATIVE DIAGNOSIS: Upper GI bleeding


POSTOPERATIVE DIAGNOSIS: Prepyloric ulcer


OPERATION: Esophagogastroduodenoscopy


SURGEON: TAMIKO TREVINO


ANESTHESIA: LMAC


TISSUE REMOVED OR ALTERED: None


COMPLICATIONS: 





None


ESTIMATED BLOOD LOSS: None


INTRAOPERATIVE FINDINGS: Prepyloric ulcer with clips in place.  No visible 

vessel.  No active bleeding.  No blood clots in the stomach nor the duodenum.  

Duodenum appears to be normal.


PROCEDURE: 





Informed consent was obtained.  Procedure was done at the patient's bedside.  

IV sedation with Versed and fentanyl was administered.  Endoscope was passed 

via the patient's mouth it was fed down to the second portion of the duodenum.  

The duodenum appeared to be normal.  Just at the pyloric channel/prepyloric 

region there was a large ulcer with clips in place.  I did not see any visible 

vessels.  There was no blood clots.  There was no fresh blood in the duodenum 

nor the stomach.  With that no active bleeding and no visible vessel seen I did 

not take any intervention measures.  Air was desufflated and the scope was 

withdrawn.  The esophagus appeared unremarkable.  Patient tolerated procedure 

well with no apparent complications.

## 2018-06-14 NOTE — PROGRESS NOTE
Provider Note


Provider Note: 





Called to see the patient regarding hematemesis.  The patient had a large 

volume hematemesis while moving to the ICU.  Patient is already on a Protonix 

drip.  We are starting a Sandostatin drip at this time.  I have ordered 2 units 

of packed red blood cells and 2 units of fresh frozen plasma.  Plan for EGD to 

identify the source of the bleeding with possible intervention.  If his 

bleeding cannot be stopped with endoscopic methods, the patient may require 

repeat laparotomy.  Risks/benefits discussed, informed consent obtained, and 

all questions answered.

## 2018-06-14 NOTE — PDOC PROGRESS REPORT
Subjective


Progress Note for:: 06/14/18


Subjective:: 





Moderate abdominal pain.  No more bowel movements since last night.  Feels weak 

but otherwise okay.


Reason For Visit: 


GI BLEED ANEMIA,HYPOTENSION








Physical Exam


Vital Signs: 


 











Temp Pulse Resp BP Pulse Ox


 


 99.1 F   86   18   94/61 L  100 


 


 06/14/18 10:10  06/14/18 10:10  06/14/18 10:10  06/14/18 10:10  06/14/18 10:10








 Intake & Output











 06/13/18 06/14/18 06/15/18





 06:59 06:59 06:59


 


Intake Total  350 613


 


Output Total  800 200


 


Balance  -450 413


 


Weight  118.6 kg 











General appearance: PRESENT: no acute distress, cooperative


Respiratory exam: PRESENT: rhonchi


Cardiovascular exam: PRESENT: RRR


GI/Abdominal exam: PRESENT: other - Protuberant, soft, mild epigastric 

abdominal tenderness without peritoneal signs.  Wound clean dry and intact


Skin exam: PRESENT: warm





Results


Laboratory Results: 


 





 06/14/18 06:56 





 06/14/18 06:56 





 











  06/14/18 06/14/18 06/14/18





  06:56 06:56 06:56


 


WBC  29.1 H  


 


RBC  2.28 L  


 


Hgb  6.7 L D  


 


Hct  19.9 L  


 


MCV  88  


 


MCH  29.4  


 


MCHC  33.7  


 


RDW  14.3 H  


 


Plt Count  397  


 


Sodium   144.4 


 


Potassium   4.3 


 


Chloride   115 H 


 


Carbon Dioxide   23 


 


Anion Gap   6 


 


BUN   26 H 


 


Creatinine   0.92 


 


Est GFR ( Amer)   > 60 


 


Est GFR (Non-Af Amer)   > 60 


 


Glucose   136 H 


 


Calcium   7.9 L 


 


Phosphorus   4.2 


 


Magnesium   2.1 


 


Total Bilirubin   0.2 


 


AST   20 


 


ALT   34 


 


Alkaline Phosphatase   47 


 


Total Protein   4.4 L 


 


Albumin   2.2 L 


 


TSH    12.60 H








 











  06/14/18





  06:56


 


NT-Pro-B Natriuret Pep  60











Impressions: 


 





Chest/Abdomen CTA  06/13/18 09:15


IMPRESSION:  No CT angio evidence of acute pulmonary emboli or thoracic aortic 

dissection


Since the CT exam 6/3/2018, patient has developed a trace left pleural effusion

, bibasilar atelectasis, and retroperitoneal stranding around the pancreatic 

tail which could indicate pancreatitis


 








Chest X-Ray  06/13/18 11:03


IMPRESSION:  No pneumothorax post left jugular line placement, with line tip in 

the superior vena cava.


Stable bandlike atelectasis left lung base


 














Assessment & Plan





- Diagnosis


(1) Gastrointestinal bleeding, upper


Is this a current diagnosis for this admission?: Yes   


Plan: 


Status post endoscopic treatment of prepyloric ulcer bleed.  Pending follow-up 

hematocrit after his last transfusion.  His hematocrit did increase with 

transfusions.  He appears hemodynamically stable.  I do not think he is 

actively bleeding at this time.  But will follow very closely.  Continue proton 

pump inhibitor.  Keep n.p.o.








(2) Pancreatitis


Qualifiers: 


   Chronicity: acute 


Is this a current diagnosis for this admission?: Yes   


Plan: 


We will keep patient at bowel rest.  Supportive care.

## 2018-06-14 NOTE — PDOC CONSULTATION
Consultation


Consult Date: 06/13/18


Consult reason:: Upper GI bleeding, status post perforated gastric ulcer repair.





History of Present Illness


Admission Date/PCP: 


  06/13/18 16:53





  





Patient complains of: Anemia, weakness


History of Present Illness: 


MARK PAYNE is a 47 year old male seen at the request of the hospitalist 

service for consultation.  This is a 47-year-old male who is approximately 1 

week status post laparotomy and repair of a perforated gastric ulcer.  The 

patient was discharged home approximately 48 hours ago.  The patient began 

having weakness yesterday at home.  He had an episode of orthostasis today, and 

re-presented to the emergency department for evaluation.  His hemoglobin was 

found to be 4.3, he was tachycardic, and he was hypotensive.  Patient received 

3 units of packed red blood cells, and his vitals stabilized.  Patient is being 

admitted to the hospitalist service.  Patient denies chest pain, shortness of 

breath, fevers, chills, abdominal pain, nausea, vomiting, melena, hematochezia, 

hematemesis.  He does report blurry vision, orthostasis, malaise.








Past Medical History


Pulmonary Medical History: Reports: Chronic Obstructive Pulmonary Disease (COPD)


Endocrine Medical History: Reports: Hypothyroidism


Malignancy Medical History: Reports: Other - Pituitary tumor


GI Medical History: Reports: Peptic Ulcer Disease


Psychiatric Medical History: 


   Denies: Depression





Past Surgical History


Past Surgical History: Reports: Orthopedic Surgery - orbital fracture, Other - 

Repair of perforated gastric ulcer





Social History


Smoking Status: Current Every Day Smoker


Frequency of Alcohol Use: None


Hx Recreational Drug Use: No


Drugs: None


Hx Prescription Drug Abuse: No





Family History


Family History: Hypertension


Parental Family History Reviewed: Yes


Children Family History Reviewed: Yes


Sibling(s) Family History Reviewed.: Yes





Medication/Allergy


Home Medications: 








Cabergoline [Cabergoline 0.5 mg Tablet] 0.25 mg PO TUFR@1000 06/03/18 


Levothyroxine Sodium [Synthroid 0.112 mg Tablet] 0.224 mg PO DAILY 06/03/18 


Metronidazole [Flagyl 500 mg Tablet] 500 mg PO TID 06/13/18 


Oxycodone HCl/Acetaminophen [Percocet 5-325 mg Tablet] 1 tab PO PRN PRN 06/13/ 18 








Allergies/Adverse Reactions: 


 





No Known Allergies Allergy (Verified 06/13/18 08:22)


 











Review of Systems


Constitutional: PRESENT: fatigue.  ABSENT: chills, fever(s)


Eyes: PRESENT: visual disturbances


Ears: ABSENT: hearing changes


Nose, Mouth, and Throat: ABSENT: sore throat


Cardiovascular: ABSENT: chest pain, palpitations


Respiratory: ABSENT: cough, dyspnea


Gastrointestinal: PRESENT: diarrhea.  ABSENT: abdominal pain, bloating, coffee 

ground emesis, hematemesis, hematochezia, melena, nausea, vomiting


Genitourinary: ABSENT: dysuria


Integumentary: ABSENT: pruritus, rash


Neurological: PRESENT: dizziness, syncope.  ABSENT: confusion


Psychiatric: ABSENT: anxiety, depression, hallucinations


Endocrine: ABSENT: cold intolerance, heat intolerance


Hematologic/Lymphatic: ABSENT: easy bleeding, easy bruising





Physical Exam


Vital Signs: 


 











Temp Pulse Resp BP Pulse Ox


 


 97.9 F      19   103/53 L  99 


 


 06/13/18 16:45     06/13/18 16:46  06/13/18 16:46  06/13/18 16:46








 Intake & Output











 06/12/18 06/13/18 06/14/18





 06:59 06:59 06:59


 


Weight   117.027 kg











General appearance: PRESENT: mild distress


Head exam: PRESENT: atraumatic, normocephalic


Eye exam: PRESENT: EOMI, PERRLA.  ABSENT: scleral icterus


Mouth exam: PRESENT: neck supple


Neck exam: ABSENT: lymphadenopathy, meningismus, tenderness, thyromegaly, 

tracheal deviation


Respiratory exam: PRESENT: clear to auscultation dorothy, unlabored.  ABSENT: chest 

wall tenderness, rales, stridor, tachypnea


Cardiovascular exam: PRESENT: tachycardia - Mild


Pulses: PRESENT: normal radial pulses


Vascular exam: PRESENT: pallor


GI/Abdominal exam: PRESENT: soft.  ABSENT: distended, tenderness


Rectal exam: PRESENT: deferred


Extremities exam: ABSENT: tenderness


Musculoskeletal exam: PRESENT: normal inspection


Neurological exam: PRESENT: alert, awake, oriented to person, oriented to place

, oriented to time, oriented to situation, CN II-XII grossly intact.  ABSENT: 

motor sensory deficit


Psychiatric exam: ABSENT: agitated, anxious, depressed


Skin exam: PRESENT: pallor.  ABSENT: cyanosis, erythema, jaundice, normal color





Results


Impressions: 


 





Chest/Abdomen CTA  06/13/18 09:15


IMPRESSION:  No CT angio evidence of acute pulmonary emboli or thoracic aortic 

dissection


Since the CT exam 6/3/2018, patient has developed a trace left pleural effusion

, bibasilar atelectasis, and retroperitoneal stranding around the pancreatic 

tail which could indicate pancreatitis


 








Chest X-Ray  06/13/18 11:03


IMPRESSION:  No pneumothorax post left jugular line placement, with line tip in 

the superior vena cava.


Stable bandlike atelectasis left lung base


 














Assessment & Plan





- Diagnosis


(1) Anemia


Qualifiers: 


   Anemia type: other cause   Other causes of anemia: acute posthemorrhagic   

Qualified Code(s): D62 - Acute posthemorrhagic anemia   


Is this a current diagnosis for this admission?: Yes   





- Plan Summary


Plan Summary: 





This is a 47-year-old male known peptic ulcer disease.  He underwent laparotomy 

and repair of a perforated gastric ulcer.  The patient returns to the hospital 

with a hemoglobin of 4.3.  The patient has no venous signs of acute, active 

bleeding.  I have reviewed the patient's CT angiogram which does not show a 

blush in the stomach.  Patient has not had any melena, hematochezia, or 

hematemesis.  He is receiving packed red blood cells now.  He is being admitted 

to the medicine service.  I have recommended very close observation for this 

patient.  If he exhibits any signs of continued blood loss, he may require EGD 

and/or operative intervention.  This is been discussed with the patient.  I 

will follow this patient very closely with you.

## 2018-06-14 NOTE — PDOC PROGRESS REPORT
Subjective


Progress Note for:: 06/14/18


Subjective:: 





feels ok. had melena. no emesis


Reason For Visit: 


GI BLEED ANEMIA,HYPOTENSION








Physical Exam


Vital Signs: 


 











Temp Pulse Resp BP Pulse Ox


 


 98.8 F   76   19   113/59 L  98 


 


 06/14/18 17:26  06/14/18 16:58  06/14/18 17:26  06/14/18 17:26  06/14/18 17:26








 Intake & Output











 06/13/18 06/14/18 06/15/18





 06:59 06:59 06:59


 


Intake Total  350 913


 


Output Total  800 1755


 


Balance  -450 -842


 


Weight  118.6 kg 











General appearance: PRESENT: no acute distress, cooperative


Respiratory exam: PRESENT: clear to auscultation dorothy


Cardiovascular exam: PRESENT: RRR


GI/Abdominal exam: PRESENT: soft - non tender





Results


Laboratory Results: 


 





 06/14/18 12:25 





 06/14/18 06:56 





 











  06/14/18 06/14/18 06/14/18





  06:56 06:56 06:56


 


WBC  29.1 H  


 


RBC  2.28 L  


 


Hgb  6.7 L D  


 


Hct  19.9 L  


 


MCV  88  


 


MCH  29.4  


 


MCHC  33.7  


 


RDW  14.3 H  


 


Plt Count  397  


 


Sodium   144.4 


 


Potassium   4.3 


 


Chloride   115 H 


 


Carbon Dioxide   23 


 


Anion Gap   6 


 


BUN   26 H 


 


Creatinine   0.92 


 


Est GFR ( Amer)   > 60 


 


Est GFR (Non-Af Amer)   > 60 


 


Glucose   136 H 


 


Calcium   7.9 L 


 


Phosphorus   4.2 


 


Magnesium   2.1 


 


Total Bilirubin   0.2 


 


AST   20 


 


ALT   34 


 


Alkaline Phosphatase   47 


 


Total Protein   4.4 L 


 


Albumin   2.2 L 


 


TSH    12.60 H














  06/14/18





  12:25


 


WBC  28.4 H


 


RBC  2.11 L


 


Hgb  6.1 L


 


Hct  18.5 L


 


MCV  88


 


MCH  29.1


 


MCHC  33.2


 


RDW  14.4 H


 


Plt Count  391


 


Sodium 


 


Potassium 


 


Chloride 


 


Carbon Dioxide 


 


Anion Gap 


 


BUN 


 


Creatinine 


 


Est GFR ( Amer) 


 


Est GFR (Non-Af Amer) 


 


Glucose 


 


Calcium 


 


Phosphorus 


 


Magnesium 


 


Total Bilirubin 


 


AST 


 


ALT 


 


Alkaline Phosphatase 


 


Total Protein 


 


Albumin 


 


TSH 








 











  06/14/18





  06:56


 


NT-Pro-B Natriuret Pep  60











Impressions: 


 





Chest/Abdomen CTA  06/13/18 09:15


IMPRESSION:  No CT angio evidence of acute pulmonary emboli or thoracic aortic 

dissection


Since the CT exam 6/3/2018, patient has developed a trace left pleural effusion

, bibasilar atelectasis, and retroperitoneal stranding around the pancreatic 

tail which could indicate pancreatitis


 








Chest X-Ray  06/13/18 11:03


IMPRESSION:  No pneumothorax post left jugular line placement, with line tip in 

the superior vena cava.


Stable bandlike atelectasis left lung base


 














Assessment & Plan





- Diagnosis


(1) Gastrointestinal bleeding, upper


Is this a current diagnosis for this admission?: Yes   


Plan: 


despite 5 units of prbc, hct only 18%. but hemodynamically stable with good 

urine output. plan repeat egd. pt getting another two units of blood. d/w pt 

risk of bleeding and intestinal injury. pt agrees to proceed. 








(2) Pancreatitis


Qualifiers: 


   Chronicity: acute 


Is this a current diagnosis for this admission?: Yes

## 2018-06-14 NOTE — OPERATIVE REPORT
Nonrecallable Operative Report


DATE OF SURGERY: 06/14/18


PREOPERATIVE DIAGNOSIS: Upper GI bleeding


POSTOPERATIVE DIAGNOSIS: Visible blood vessel in the area of previous gastric 

ulcer repair


OPERATION: 1.  EGD.  2.  Submucosal injection of epinephrine at bleeding site.  

3.  Cauterization of bleeding vessel.  4.  Endoscopic clip placement 2


SURGEON: CHRYSTAL MELGOZA


ANESTHESIA: GA


TISSUE REMOVED OR ALTERED: None


COMPLICATIONS: 





None apparent


ESTIMATED BLOOD LOSS: Old clot within the stomach


PROCEDURE: 





Drains/implants none.





Procedure in detail: After informed consent was obtained, the patient was 

brought into the operating room and laid in the supine position.  After general 

endotracheal anesthesia was attained, the endoscope was passed down the 

oropharynx, down the esophagus, and into the stomach.  There was a large clot 

in the cardia of the stomach.  This was removed via a Condon net.  The scope was 

passed into the stomach.  The stomach was irrigated, suctioned, and inspected.  

No visible vessels or active bleeding could be identified in the cardia, fundus

, or gastric body.  In the antrum, at the area of previous repair, there was a 

small visible vessel with clot around it.  This appeared to be in the anterior 

stomach in the immediately prepyloric position.  This was irrigated and washed.

  The small visible vessel that was felt to be the most likely cause of his 

upper GI bleeding.  Epinephrine was injected submucosally around the lesion.  

The lesion was then cauterized significantly.  Resolution clips were placed 

proximally and distally at the area of bleeding.  This halted the bleeding 

successfully.  The scope was passed into the duodenum.  No bleeding could be 

identified in the first or second portion.  The scope was pulled back into the 

esophagus.  The esophagus was smooth in contour without any evidence of 

bleeding.  Once this was confirmed, the scope was removed.  The procedure was 

concluded.  All sponge, instrument, and needle counts were correct 2.





Condition: Fair.

## 2018-06-15 LAB
ABSOLUTE LYMPHOCYTES# (MANUAL): 1.2 10^3/UL (ref 0.5–4.7)
ABSOLUTE MONOCYTES # (MANUAL): 1.2 10^3/UL (ref 0.1–1.4)
ABSOLUTE NEUTROPHILS# (MANUAL): 19.6 10^3/UL (ref 1.7–8.2)
ADD MANUAL DIFF: YES
ALBUMIN SERPL-MCNC: 2.3 G/DL (ref 3.5–5)
ALP SERPL-CCNC: 45 U/L (ref 38–126)
ALT SERPL-CCNC: 35 U/L (ref 21–72)
ANION GAP SERPL CALC-SCNC: 5 MMOL/L (ref 5–19)
ANISOCYTOSIS BLD QL SMEAR: (no result)
AST SERPL-CCNC: 18 U/L (ref 17–59)
BASOPHILS NFR BLD MANUAL: 2 % (ref 0–2)
BILIRUB DIRECT SERPL-MCNC: 0.1 MG/DL (ref 0–0.4)
BILIRUB SERPL-MCNC: 0.1 MG/DL (ref 0.2–1.3)
BUN SERPL-MCNC: 17 MG/DL (ref 7–20)
CALCIUM: 8.2 MG/DL (ref 8.4–10.2)
CHLORIDE SERPL-SCNC: 115 MMOL/L (ref 98–107)
CO2 SERPL-SCNC: 26 MMOL/L (ref 22–30)
EOSINOPHIL NFR BLD MANUAL: 4 % (ref 0–6)
ERYTHROCYTE [DISTWIDTH] IN BLOOD BY AUTOMATED COUNT: 16.6 % (ref 11.5–14)
ERYTHROCYTE [DISTWIDTH] IN BLOOD BY AUTOMATED COUNT: 16.7 % (ref 11.5–14)
GLUCOSE SERPL-MCNC: 110 MG/DL (ref 75–110)
HCT VFR BLD CALC: 22.4 % (ref 37.9–51)
HCT VFR BLD CALC: 22.6 % (ref 37.9–51)
HGB BLD-MCNC: 7.4 G/DL (ref 13.5–17)
HGB BLD-MCNC: 7.5 G/DL (ref 13.5–17)
MCH RBC QN AUTO: 28.2 PG (ref 27–33.4)
MCH RBC QN AUTO: 28.8 PG (ref 27–33.4)
MCHC RBC AUTO-ENTMCNC: 33.1 G/DL (ref 32–36)
MCHC RBC AUTO-ENTMCNC: 33.1 G/DL (ref 32–36)
MCV RBC AUTO: 85 FL (ref 80–97)
MCV RBC AUTO: 87 FL (ref 80–97)
METAMYELOCYTES % (MANUAL): 2 %
MONOCYTES % (MANUAL): 5 % (ref 3–13)
MYELOCYTES % (MANUAL): 1 %
NEUTS BAND NFR BLD MANUAL: 1 % (ref 3–5)
NRBC BLD AUTO-RTO: 1 /100 WBC
OVALOCYTES BLD QL SMEAR: (no result)
PHOSPHATE SERPL-MCNC: 4.2 MG/DL (ref 2.5–4.5)
PLATELET # BLD: 415 10^3/UL (ref 150–450)
PLATELET # BLD: 420 10^3/UL (ref 150–450)
PLATELET COMMENT: ADEQUATE
POIKILOCYTOSIS BLD QL SMEAR: (no result)
POLYCHROMASIA BLD QL SMEAR: (no result)
POTASSIUM SERPL-SCNC: 4.5 MMOL/L (ref 3.6–5)
PROT SERPL-MCNC: 4.6 G/DL (ref 6.3–8.2)
RBC # BLD AUTO: 2.6 10^6/UL (ref 4.35–5.55)
RBC # BLD AUTO: 2.63 10^6/UL (ref 4.35–5.55)
SEGMENTED NEUTROPHILS % (MAN): 80 % (ref 42–78)
SODIUM SERPL-SCNC: 146.2 MMOL/L (ref 137–145)
TOTAL CELLS COUNTED BLD: 100
TOXIC GRANULES BLD QL SMEAR: (no result)
VARIANT LYMPHS NFR BLD MANUAL: 5 % (ref 13–45)
WBC # BLD AUTO: 23.2 10^3/UL (ref 4–10.5)
WBC # BLD AUTO: 23.3 10^3/UL (ref 4–10.5)

## 2018-06-15 RX ADMIN — PANTOPRAZOLE SODIUM PRN MG: 40 INJECTION, POWDER, FOR SOLUTION INTRAVENOUS at 11:18

## 2018-06-15 RX ADMIN — SODIUM CHLORIDE AND POTASSIUM CHLORIDE PRN ML: .9; .15 SOLUTION INTRAVENOUS at 04:52

## 2018-06-15 RX ADMIN — METRONIDAZOLE SCH ML: 500 INJECTION, SOLUTION INTRAVENOUS at 00:34

## 2018-06-15 RX ADMIN — Medication SCH ML: at 13:40

## 2018-06-15 RX ADMIN — FENTANYL CITRATE PRN MCG: 50 INJECTION INTRAMUSCULAR; INTRAVENOUS at 06:08

## 2018-06-15 RX ADMIN — PROBIOTIC PRODUCT - TAB SCH MG: TAB at 18:38

## 2018-06-15 RX ADMIN — FENTANYL CITRATE PRN MCG: 50 INJECTION INTRAMUSCULAR; INTRAVENOUS at 13:40

## 2018-06-15 RX ADMIN — Medication SCH ML: at 22:45

## 2018-06-15 RX ADMIN — METRONIDAZOLE SCH MG: 500 TABLET ORAL at 22:45

## 2018-06-15 RX ADMIN — FENTANYL CITRATE PRN MCG: 50 INJECTION INTRAMUSCULAR; INTRAVENOUS at 19:01

## 2018-06-15 RX ADMIN — FENTANYL CITRATE PRN MCG: 50 INJECTION INTRAMUSCULAR; INTRAVENOUS at 22:45

## 2018-06-15 RX ADMIN — METRONIDAZOLE SCH ML: 500 INJECTION, SOLUTION INTRAVENOUS at 06:07

## 2018-06-15 RX ADMIN — METRONIDAZOLE SCH ML: 500 INJECTION, SOLUTION INTRAVENOUS at 11:23

## 2018-06-15 RX ADMIN — Medication SCH ML: at 06:09

## 2018-06-15 RX ADMIN — PANTOPRAZOLE SODIUM PRN MG: 40 INJECTION, POWDER, FOR SOLUTION INTRAVENOUS at 21:19

## 2018-06-15 RX ADMIN — FENTANYL CITRATE PRN MCG: 50 INJECTION INTRAMUSCULAR; INTRAVENOUS at 01:58

## 2018-06-15 NOTE — PDOC PROGRESS REPORT
Subjective


Progress Note for:: 06/15/18


Subjective:: 








The patient is a 47-year-old gentleman with a past medical history of


Pituitary adenoma


Hypothyroidism


Nicotine dependence


COPD


Obstructive sleep apnea





He was discharged on June 11 after perforated gastric ulcer repair.


During that hospital stay he was also treated for C. difficile diarrhea.





The patient presented back to the hospital on June 13 with melena and severe 

anemia.


He received 3 units of packed red blood cells and was seen in consultation by 

the surgical service.





He developed some hematemesis overnight on June 13 and an endoscopy was done by 

Dr. Au.


He was intubated during the procedure and was extubated early this morning.


He was transfused 2 more units of blood and was also given FFP


A large clot was found in the cardia of the stomach and was removed.  The 

stomach was irrigated suctioned and inspected.


In the antrum at the area of previous repair there was a small visible vessel 

with clot around it which was irrigated and epinephrine was injected 

submucosally and then cauterized.


Resolution clips were placed proximally and distally which halted the bleeding.


No bleeding was seen in the first or second part of the duodenum.





He has received a total of 7 units of packed red blood cells.


We will continue to monitor closely.  No complaints at present.


Okay to start clear liquids per surgical service.








Reason For Visit: 


GI BLEED ANEMIA,HYPOTENSION








Physical Exam


Vital Signs: 


 











Temp Pulse Resp BP Pulse Ox


 


 98.6 F   75   21 H  109/61   100 


 


 06/15/18 14:00  06/15/18 14:00  06/15/18 16:00  06/15/18 15:35  06/15/18 16:00








 Intake & Output











 06/14/18 06/15/18 06/16/18





 06:59 06:59 06:59


 


Intake Total 350 6125 


 


Output Total 800 4780 1025


 


Balance -450 1345 -1025


 


Weight 118.6 kg 121.7 kg 











General appearance: PRESENT: no acute distress


Head exam: PRESENT: normocephalic


Eye exam: ABSENT: scleral icterus


Ear exam: PRESENT: normal external ear exam


Mouth exam: PRESENT: moist


Respiratory exam: PRESENT: symmetrical, unlabored.  ABSENT: rhonchi


Cardiovascular exam: PRESENT: RRR


GI/Abdominal exam: PRESENT: normal bowel sounds, soft.  ABSENT: tenderness


Rectal exam: PRESENT: deferred


Extremities exam: ABSENT: pedal edema


Neurological exam: PRESENT: alert, awake, oriented to person, oriented to place

, oriented to time, oriented to situation


Psychiatric exam: PRESENT: appropriate affect





Results


Laboratory Results: 


 





 06/15/18 08:25 





 06/15/18 06:15 





 











  06/14/18 06/14/18 06/15/18





  21:44 21:44 06:15


 


WBC   24.4 H  23.2 H


 


RBC   2.62 L  2.63 L


 


Hgb   7.4 L  7.4 L


 


Hct   22.4 L  22.4 L


 


MCV   85  85


 


MCH   28.4  28.2


 


MCHC   33.3  33.1


 


RDW   16.0 H  16.6 H


 


Plt Count   382  420


 


Seg Neutrophils %   


 


Lymphocytes %   


 


Monocytes %   


 


Eosinophils %   


 


Basophils %   


 


Absolute Neutrophils   


 


Absolute Lymphocytes   


 


Absolute Monocytes   


 


Absolute Eosinophils   


 


Absolute Basophils   


 


Sodium  145.8 H  


 


Potassium  4.2  


 


Chloride  114 H  


 


Carbon Dioxide  27  


 


Anion Gap  5  


 


BUN  20  


 


Creatinine  0.93  


 


Est GFR ( Amer)  > 60  


 


Est GFR (Non-Af Amer)  > 60  


 


Glucose  109  


 


Calcium  8.1 L  


 


Phosphorus   


 


Magnesium  2.1  


 


Total Bilirubin   


 


AST   


 


ALT   


 


Alkaline Phosphatase   


 


Total Protein   


 


Albumin   














  06/15/18 06/15/18





  06:15 08:25


 


WBC   23.3 H


 


RBC   2.60 L


 


Hgb   7.5 L


 


Hct   22.6 L


 


MCV   87


 


MCH   28.8


 


MCHC   33.1


 


RDW   16.7 H


 


Plt Count   415


 


Seg Neutrophils %   Not Reportable


 


Lymphocytes %   Not Reportable


 


Monocytes %   Not Reportable


 


Eosinophils %   Not Reportable


 


Basophils %   Not Reportable


 


Absolute Neutrophils   Not Reportable


 


Absolute Lymphocytes   Not Reportable


 


Absolute Monocytes   Not Reportable


 


Absolute Eosinophils   Not Reportable


 


Absolute Basophils   Not Reportable


 


Sodium  146.2 H 


 


Potassium  4.5 


 


Chloride  115 H 


 


Carbon Dioxide  26 


 


Anion Gap  5 


 


BUN  17 


 


Creatinine  0.95 


 


Est GFR ( Amer)  > 60 


 


Est GFR (Non-Af Amer)  > 60 


 


Glucose  110 


 


Calcium  8.2 L 


 


Phosphorus  4.2 


 


Magnesium  2.0 


 


Total Bilirubin  0.1 L 


 


AST  18 


 


ALT  35 


 


Alkaline Phosphatase  45 


 


Total Protein  4.6 L 


 


Albumin  2.3 L 








 











  06/14/18





  06:56


 


NT-Pro-B Natriuret Pep  60











Impressions: 


 





Chest/Abdomen CTA  06/13/18 09:15


IMPRESSION:  No CT angio evidence of acute pulmonary emboli or thoracic aortic 

dissection


Since the CT exam 6/3/2018, patient has developed a trace left pleural effusion

, bibasilar atelectasis, and retroperitoneal stranding around the pancreatic 

tail which could indicate pancreatitis


 








Chest X-Ray  06/13/18 11:03


IMPRESSION:  No pneumothorax post left jugular line placement, with line tip in 

the superior vena cava.


Stable bandlike atelectasis left lung base


 














Assessment & Plan





- Diagnosis


(1) Severe anemia


Is this a current diagnosis for this admission?: Yes   


Plan: 


He has received a total of 7 units of packed red blood cells and 2 units of FFP 

since admission.


Continue to monitor hemoglobin and transfuse as needed to keep hemoglobin 8 or 

above.








(2) Hypotension


Is this a current diagnosis for this admission?: Yes   


Plan: 


Resolved.  Continue to monitor.











(3) Upper GI bleed


Is this a current diagnosis for this admission?: Yes   


Plan: 


Bleeding vessel in the antrum status post epinephrine injections cautery and 

clips.


Management per surgical service








(4) C. difficile diarrhea


Is this a current diagnosis for this admission?: Yes   


Plan: 


PO Flagyl








(5) Hypothyroidism


Is this a current diagnosis for this admission?: Yes   


Plan: 


Continue Synthroid








(6) Pituitary tumor


Is this a current diagnosis for this admission?: Yes   


Plan: 


Continue cabergoline








(7) DVT prophylaxis


Is this a current diagnosis for this admission?: Yes   


Plan: 


Sequential teds.








(8) COPD (chronic obstructive pulmonary disease) with emphysema


Qualifiers: 


   Emphysema type: centrilobular   Qualified Code(s): J43.2 - Centrilobular 

emphysema   


Is this a current diagnosis for this admission?: Yes   


Plan: 


Nebs as needed.  Not in exacerbation.








- Time


Time Spent with patient: 35 or more minutes

## 2018-06-15 NOTE — PDOC PROGRESS REPORT
Subjective


Progress Note for:: 06/15/18


Subjective:: 





In the ICU, feels well; hemodynamically stable; adequate urine output.


Reason For Visit: 


GI BLEED ANEMIA,HYPOTENSION








Physical Exam


Vital Signs: 


 











Temp Pulse Resp BP Pulse Ox


 


 98.4 F   69   19   106/66   99 


 


 06/15/18 08:00  06/15/18 08:00  06/15/18 08:00  06/15/18 08:00  06/15/18 08:00








 Intake & Output











 06/14/18 06/15/18 06/16/18





 06:59 06:59 06:59


 


Intake Total 350 6125 


 


Output Total 800 4780 225


 


Balance -450 1345 -225


 


Weight 118.6 kg 121.7 kg 











General appearance: PRESENT: no acute distress


GI/Abdominal exam: PRESENT: other - Abdomen benign; half staples still in; no 

peritoneal signs





Results


Laboratory Results: 


 





 06/15/18 06:15 





 06/15/18 06:15 





 











  06/14/18 06/14/18 06/14/18





  12:25 21:44 21:44


 


WBC  28.4 H   24.4 H


 


RBC  2.11 L   2.62 L


 


Hgb  6.1 L   7.4 L


 


Hct  18.5 L   22.4 L


 


MCV  88   85


 


MCH  29.1   28.4


 


MCHC  33.2   33.3


 


RDW  14.4 H   16.0 H


 


Plt Count  391   382


 


Sodium   145.8 H 


 


Potassium   4.2 


 


Chloride   114 H 


 


Carbon Dioxide   27 


 


Anion Gap   5 


 


BUN   20 


 


Creatinine   0.93 


 


Est GFR ( Amer)   > 60 


 


Est GFR (Non-Af Amer)   > 60 


 


Glucose   109 


 


Calcium   8.1 L 


 


Phosphorus   


 


Magnesium   2.1 


 


Total Bilirubin   


 


AST   


 


ALT   


 


Alkaline Phosphatase   


 


Total Protein   


 


Albumin   














  06/15/18 06/15/18





  06:15 06:15


 


WBC  23.2 H 


 


RBC  2.63 L 


 


Hgb  7.4 L 


 


Hct  22.4 L 


 


MCV  85 


 


MCH  28.2 


 


MCHC  33.1 


 


RDW  16.6 H 


 


Plt Count  420 


 


Sodium   146.2 H


 


Potassium   4.5


 


Chloride   115 H


 


Carbon Dioxide   26


 


Anion Gap   5


 


BUN   17


 


Creatinine   0.95


 


Est GFR ( Amer)   > 60


 


Est GFR (Non-Af Amer)   > 60


 


Glucose   110


 


Calcium   8.2 L


 


Phosphorus   4.2


 


Magnesium   2.0


 


Total Bilirubin   0.1 L


 


AST   18


 


ALT   35


 


Alkaline Phosphatase   45


 


Total Protein   4.6 L


 


Albumin   2.3 L








 











  06/14/18





  06:56


 


NT-Pro-B Natriuret Pep  60











Impressions: 


 





Chest/Abdomen CTA  06/13/18 09:15


IMPRESSION:  No CT angio evidence of acute pulmonary emboli or thoracic aortic 

dissection


Since the CT exam 6/3/2018, patient has developed a trace left pleural effusion

, bibasilar atelectasis, and retroperitoneal stranding around the pancreatic 

tail which could indicate pancreatitis


 








Chest X-Ray  06/13/18 11:03


IMPRESSION:  No pneumothorax post left jugular line placement, with line tip in 

the superior vena cava.


Stable bandlike atelectasis left lung base


 














Assessment & Plan





- Diagnosis


(1) Upper GI bleed


Is this a current diagnosis for this admission?: Yes   


Plan: 


Patient is 2 days status post control of upper GI hemorrhage secondary to 

bleeding from previously perforated gastric ulcer.  Patient underwent re-

endoscopy yesterday which showed clips in good position and no active bleeding.

  Hemoglobin stable at 7.4.  Patient has no clinical or serologic evidence of 

ongoing bleeding.








Recommendations:





1.  We will start clear liquids slowly.





2.  Keep in ICU, out of bed to chair.





3.  Discussed with hospitalist service; will start p.o. Flagyl for C. difficile 

infection





4.  If patient rebleeds or leaks or has any problems from the ulcer, he will 

need a formal operative antrectomy ; This was explained to the patient, and 

nursing staff.

## 2018-06-16 LAB
%HYPO/RBC NFR BLD AUTO: (no result) %
ABSOLUTE LYMPHOCYTES# (MANUAL): 1.3 10^3/UL (ref 0.5–4.7)
ABSOLUTE MONOCYTES # (MANUAL): 0.4 10^3/UL (ref 0.1–1.4)
ABSOLUTE NEUTROPHILS# (MANUAL): 20.1 10^3/UL (ref 1.7–8.2)
ADD MANUAL DIFF: YES
ALBUMIN SERPL-MCNC: 2.2 G/DL (ref 3.5–5)
ALP SERPL-CCNC: 49 U/L (ref 38–126)
ALT SERPL-CCNC: 29 U/L (ref 21–72)
ANION GAP SERPL CALC-SCNC: 5 MMOL/L (ref 5–19)
ANISOCYTOSIS BLD QL SMEAR: (no result)
AST SERPL-CCNC: 17 U/L (ref 17–59)
BASO STIPL BLD QL SMEAR: PRESENT
BASOPHILS NFR BLD MANUAL: 0 % (ref 0–2)
BILIRUB SERPL-MCNC: < 0.1 MG/DL (ref 0.2–1.3)
BUN SERPL-MCNC: 14 MG/DL (ref 7–20)
CALCIUM: 8.2 MG/DL (ref 8.4–10.2)
CHLORIDE SERPL-SCNC: 112 MMOL/L (ref 98–107)
CO2 SERPL-SCNC: 28 MMOL/L (ref 22–30)
EOSINOPHIL NFR BLD MANUAL: 0 % (ref 0–6)
ERYTHROCYTE [DISTWIDTH] IN BLOOD BY AUTOMATED COUNT: 16 % (ref 11.5–14)
FREE T4 (FREE THYROXINE): 0.6 NG/DL (ref 0.78–2.19)
GLUCOSE SERPL-MCNC: 104 MG/DL (ref 75–110)
HCT VFR BLD CALC: 23 % (ref 37.9–51)
HGB BLD-MCNC: 7.7 G/DL (ref 13.5–17)
MCH RBC QN AUTO: 29.1 PG (ref 27–33.4)
MCHC RBC AUTO-ENTMCNC: 33.5 G/DL (ref 32–36)
MCV RBC AUTO: 87 FL (ref 80–97)
METAMYELOCYTES % (MANUAL): 4 %
MONOCYTES % (MANUAL): 2 % (ref 3–13)
MYELOCYTES % (MANUAL): 4 %
NEUTS BAND NFR BLD MANUAL: 6 % (ref 3–5)
PHOSPHATE SERPL-MCNC: 3.4 MG/DL (ref 2.5–4.5)
PLATELET # BLD: 467 10^3/UL (ref 150–450)
PLATELET COMMENT: (no result)
POIKILOCYTOSIS BLD QL SMEAR: (no result)
POLYCHROMASIA BLD QL SMEAR: (no result)
POTASSIUM SERPL-SCNC: 3.9 MMOL/L (ref 3.6–5)
PROT SERPL-MCNC: 4.5 G/DL (ref 6.3–8.2)
RBC # BLD AUTO: 2.65 10^6/UL (ref 4.35–5.55)
SEGMENTED NEUTROPHILS % (MAN): 78 % (ref 42–78)
SODIUM SERPL-SCNC: 145.2 MMOL/L (ref 137–145)
TOTAL CELLS COUNTED BLD: 100
TSH SERPL-ACNC: 9.68 UIU/ML (ref 0.47–4.68)
VARIANT LYMPHS NFR BLD MANUAL: 6 % (ref 13–45)
WBC # BLD AUTO: 21.8 10^3/UL (ref 4–10.5)

## 2018-06-16 RX ADMIN — FENTANYL CITRATE PRN MCG: 50 INJECTION INTRAMUSCULAR; INTRAVENOUS at 06:44

## 2018-06-16 RX ADMIN — OXYCODONE AND ACETAMINOPHEN PRN TAB: 5; 325 TABLET ORAL at 22:17

## 2018-06-16 RX ADMIN — FENTANYL CITRATE PRN MCG: 50 INJECTION INTRAMUSCULAR; INTRAVENOUS at 02:51

## 2018-06-16 RX ADMIN — FENTANYL CITRATE PRN MCG: 50 INJECTION INTRAMUSCULAR; INTRAVENOUS at 11:20

## 2018-06-16 RX ADMIN — FENTANYL CITRATE PRN MCG: 50 INJECTION INTRAMUSCULAR; INTRAVENOUS at 15:52

## 2018-06-16 RX ADMIN — Medication SCH ML: at 05:11

## 2018-06-16 RX ADMIN — PANTOPRAZOLE SODIUM PRN MG: 40 INJECTION, POWDER, FOR SOLUTION INTRAVENOUS at 05:11

## 2018-06-16 RX ADMIN — METRONIDAZOLE SCH MG: 500 TABLET ORAL at 14:34

## 2018-06-16 RX ADMIN — Medication SCH CAP: at 09:34

## 2018-06-16 RX ADMIN — METRONIDAZOLE SCH MG: 500 TABLET ORAL at 05:11

## 2018-06-16 RX ADMIN — PROBIOTIC PRODUCT - TAB SCH MG: TAB at 17:48

## 2018-06-16 RX ADMIN — SUCRALFATE SCH GM: 1 SUSPENSION ORAL at 15:51

## 2018-06-16 RX ADMIN — PROBIOTIC PRODUCT - TAB SCH MG: TAB at 09:33

## 2018-06-16 RX ADMIN — Medication SCH ML: at 14:41

## 2018-06-16 RX ADMIN — FENTANYL CITRATE PRN MCG: 50 INJECTION INTRAMUSCULAR; INTRAVENOUS at 20:03

## 2018-06-16 RX ADMIN — OXYCODONE AND ACETAMINOPHEN PRN TAB: 5; 325 TABLET ORAL at 09:38

## 2018-06-16 RX ADMIN — Medication SCH ML: at 22:18

## 2018-06-16 RX ADMIN — SUCRALFATE SCH GM: 1 SUSPENSION ORAL at 09:32

## 2018-06-16 RX ADMIN — METRONIDAZOLE SCH MG: 500 TABLET ORAL at 22:17

## 2018-06-16 RX ADMIN — SUCRALFATE SCH GM: 1 SUSPENSION ORAL at 11:27

## 2018-06-16 RX ADMIN — SUCRALFATE SCH GM: 1 SUSPENSION ORAL at 22:16

## 2018-06-16 RX ADMIN — LANSOPRAZOLE SCH MG: 30 TABLET, ORALLY DISINTEGRATING, DELAYED RELEASE ORAL at 17:47

## 2018-06-16 NOTE — PDOC PROGRESS REPORT
Subjective


Progress Note for:: 06/16/18


Subjective:: 








The patient is a 47-year-old gentleman with a past medical history of


Pituitary adenoma


Hypothyroidism


Nicotine dependence


COPD


Obstructive sleep apnea





He was discharged on June 11 after perforated gastric ulcer repair.


During that hospital stay he was also treated for C. difficile diarrhea.





The patient presented back to the hospital on June 13 with melena and severe 

anemia.


He received 3 units of packed red blood cells and was seen in consultation by 

the surgical service.





He developed some hematemesis overnight on June 13 and an endoscopy was done by 

Dr. Au.


He was intubated during the procedure and was extubated early this morning.


He was transfused 2 more units of blood and was also given FFP


A large clot was found in the cardia of the stomach and was removed.  The 

stomach was irrigated suctioned and inspected.


In the antrum at the area of previous repair there was a small visible vessel 

with clot around it which was irrigated and epinephrine was injected 

submucosally and then cauterized.


Resolution clips were placed proximally and distally which halted the bleeding.


No bleeding was seen in the first or second part of the duodenum.





He has received a total of 7 units of packed red blood cells.





No complaints at present.


No further hematemesis or melena.





Hemoglobin has remained stable.  Continue to monitor.


Diet is being advanced by the surgical service.


TSH is elevated, Levothyroxine dose increased from 224 to 250 mcg.











Reason For Visit: 


GI BLEED ANEMIA,HYPOTENSION








Physical Exam


Vital Signs: 


 











Temp Pulse Resp BP Pulse Ox


 


 97.0 F   64   18   113/70   97 


 


 06/16/18 08:00  06/16/18 08:00  06/16/18 11:36  06/16/18 11:36  06/16/18 11:36








 Intake & Output











 06/15/18 06/16/18 06/17/18





 06:59 06:59 06:59


 


Intake Total 6125 1917 


 


Output Total 2548 3464 375


 


Balance 7345 -308 -375


 


Weight 121.7 kg 122.9 kg 











General appearance: PRESENT: no acute distress


Head exam: PRESENT: normocephalic


Eye exam: ABSENT: scleral icterus


Mouth exam: PRESENT: moist


Respiratory exam: PRESENT: rhonchi, symmetrical, unlabored.  ABSENT: wheezes


Cardiovascular exam: PRESENT: RRR


GI/Abdominal exam: PRESENT: normal bowel sounds, soft.  ABSENT: tenderness


Rectal exam: PRESENT: deferred


Extremities exam: ABSENT: pedal edema


Neurological exam: PRESENT: alert, awake, oriented to person, oriented to place

, oriented to time, oriented to situation


Psychiatric exam: PRESENT: appropriate affect





Results


Laboratory Results: 


 





 06/16/18 05:08 





 06/16/18 05:08 





 











  06/16/18 06/16/18 06/16/18





  05:08 05:08 05:08


 


WBC  21.8 H  


 


RBC  2.65 L  


 


Hgb  7.7 L  


 


Hct  23.0 L  


 


MCV  87  


 


MCH  29.1  


 


MCHC  33.5  


 


RDW  16.0 H  


 


Plt Count  467 H  


 


Seg Neutrophils %  Not Reportable  


 


Lymphocytes %  Not Reportable  


 


Monocytes %  Not Reportable  


 


Eosinophils %  Not Reportable  


 


Basophils %  Not Reportable  


 


Absolute Neutrophils  Not Reportable  


 


Absolute Lymphocytes  Not Reportable  


 


Absolute Monocytes  Not Reportable  


 


Absolute Eosinophils  Not Reportable  


 


Absolute Basophils  Not Reportable  


 


Sodium   145.2 H 


 


Potassium   3.9 


 


Chloride   112 H 


 


Carbon Dioxide   28 


 


Anion Gap   5 


 


BUN   14 


 


Creatinine   0.82 


 


Est GFR ( Amer)   > 60 


 


Est GFR (Non-Af Amer)   > 60 


 


Glucose   104 


 


Calcium   8.2 L 


 


Phosphorus   3.4 


 


Magnesium   2.1 


 


Total Bilirubin   < 0.1 L 


 


AST   17 


 


ALT   29 


 


Alkaline Phosphatase   49 


 


Total Protein   4.5 L 


 


Albumin   2.2 L 


 


TSH    9.68 H


 


Free T4    0.60 L








 











  06/14/18





  06:56


 


NT-Pro-B Natriuret Pep  60











Impressions: 


 





Chest/Abdomen CTA  06/13/18 09:15


IMPRESSION:  No CT angio evidence of acute pulmonary emboli or thoracic aortic 

dissection


Since the CT exam 6/3/2018, patient has developed a trace left pleural effusion

, bibasilar atelectasis, and retroperitoneal stranding around the pancreatic 

tail which could indicate pancreatitis


 








Chest X-Ray  06/13/18 11:03


IMPRESSION:  No pneumothorax post left jugular line placement, with line tip in 

the superior vena cava.


Stable bandlike atelectasis left lung base


 














Assessment & Plan





- Diagnosis


(1) Severe anemia


Is this a current diagnosis for this admission?: Yes   


Plan: 


He has received a total of 7 units of packed red blood cells and 2 units of FFP 

since admission.


Continue to monitor hemoglobin and transfuse as needed.








(2) Hypotension


Is this a current diagnosis for this admission?: Yes   


Plan: 


Resolved.  Continue to monitor.











(3) Upper GI bleed


Is this a current diagnosis for this admission?: Yes   


Plan: 


Bleeding vessel in the antrum status post epinephrine injections cautery and 

clips.


Management per surgical service








(4) C. difficile diarrhea


Is this a current diagnosis for this admission?: Yes   


Plan: 


PO Flagyl








(5) Hypothyroidism


Is this a current diagnosis for this admission?: Yes   


Plan: 


Synthroid dose increased








(6) Pituitary tumor


Is this a current diagnosis for this admission?: Yes   


Plan: 


Continue Cabergoline twice a week








(7) DVT prophylaxis


Is this a current diagnosis for this admission?: Yes   


Plan: 


Sequential teds.








(8) COPD (chronic obstructive pulmonary disease) with emphysema


Qualifiers: 


   Emphysema type: centrilobular   Qualified Code(s): J43.2 - Centrilobular 

emphysema   


Is this a current diagnosis for this admission?: Yes   


Plan: 


Stable. Nebs as needed. 








- Time


Time Spent with patient: 25-34 minutes

## 2018-06-16 NOTE — PDOC PROGRESS REPORT
Subjective


Progress Note for:: 06/16/18


Subjective:: 





Patient has no complaints, desaturates when he is out of bed, wearing oxygen; 

hemodynamically stable, no problems still in the ICU transferring bed to chair.


Reason For Visit: 


GI BLEED ANEMIA,HYPOTENSION








Physical Exam


Vital Signs: 


 











Temp Pulse Resp BP Pulse Ox


 


 98.5 F   73   21 H  119/77   99 


 


 06/16/18 05:20  06/15/18 20:11  06/16/18 06:00  06/16/18 05:35  06/16/18 06:00








 Intake & Output











 06/15/18 06/16/18 06/17/18





 06:59 06:59 06:59


 


Intake Total 6125 1917 


 


Output Total 4780 2375 


 


Balance 1345 -758 


 


Weight 121.7 kg 122.9 kg 











General appearance: PRESENT: no acute distress


GI/Abdominal exam: PRESENT: other - All staples out; soft no peritoneal signs 

no rigidity.


Musculoskeletal exam: PRESENT: other - Some puffiness to the hands





Results


Laboratory Results: 


 





 06/16/18 05:08 





 06/16/18 05:08 





 











  06/15/18 06/16/18 06/16/18





  08:25 05:08 05:08


 


WBC  23.3 H  21.8 H 


 


RBC  2.60 L  2.65 L 


 


Hgb  7.5 L  7.7 L 


 


Hct  22.6 L  23.0 L 


 


MCV  87  87 


 


MCH  28.8  29.1 


 


MCHC  33.1  33.5 


 


RDW  16.7 H  16.0 H 


 


Plt Count  415  467 H 


 


Seg Neutrophils %  Not Reportable  Not Reportable 


 


Lymphocytes %  Not Reportable  Not Reportable 


 


Monocytes %  Not Reportable  Not Reportable 


 


Eosinophils %  Not Reportable  Not Reportable 


 


Basophils %  Not Reportable  Not Reportable 


 


Absolute Neutrophils  Not Reportable  Not Reportable 


 


Absolute Lymphocytes  Not Reportable  Not Reportable 


 


Absolute Monocytes  Not Reportable  Not Reportable 


 


Absolute Eosinophils  Not Reportable  Not Reportable 


 


Absolute Basophils  Not Reportable  Not Reportable 


 


Sodium    145.2 H


 


Potassium    3.9


 


Chloride    112 H


 


Carbon Dioxide    28


 


Anion Gap    5


 


BUN    14


 


Creatinine    0.82


 


Est GFR ( Amer)    > 60


 


Est GFR (Non-Af Amer)    > 60


 


Glucose    104


 


Calcium    8.2 L


 


Phosphorus    3.4


 


Magnesium    2.1


 


Total Bilirubin    < 0.1 L


 


AST    17


 


ALT    29


 


Alkaline Phosphatase    49


 


Total Protein    4.5 L


 


Albumin    2.2 L


 


TSH   


 


Free T4   














  06/16/18





  05:08


 


WBC 


 


RBC 


 


Hgb 


 


Hct 


 


MCV 


 


MCH 


 


MCHC 


 


RDW 


 


Plt Count 


 


Seg Neutrophils % 


 


Lymphocytes % 


 


Monocytes % 


 


Eosinophils % 


 


Basophils % 


 


Absolute Neutrophils 


 


Absolute Lymphocytes 


 


Absolute Monocytes 


 


Absolute Eosinophils 


 


Absolute Basophils 


 


Sodium 


 


Potassium 


 


Chloride 


 


Carbon Dioxide 


 


Anion Gap 


 


BUN 


 


Creatinine 


 


Est GFR ( Amer) 


 


Est GFR (Non-Af Amer) 


 


Glucose 


 


Calcium 


 


Phosphorus 


 


Magnesium 


 


Total Bilirubin 


 


AST 


 


ALT 


 


Alkaline Phosphatase 


 


Total Protein 


 


Albumin 


 


TSH  9.68 H


 


Free T4  0.60 L








 











  06/14/18





  06:56


 


NT-Pro-B Natriuret Pep  60











Impressions: 


 





Chest/Abdomen CTA  06/13/18 09:15


IMPRESSION:  No CT angio evidence of acute pulmonary emboli or thoracic aortic 

dissection


Since the CT exam 6/3/2018, patient has developed a trace left pleural effusion

, bibasilar atelectasis, and retroperitoneal stranding around the pancreatic 

tail which could indicate pancreatitis


 








Chest X-Ray  06/13/18 11:03


IMPRESSION:  No pneumothorax post left jugular line placement, with line tip in 

the superior vena cava.


Stable bandlike atelectasis left lung base


 














Assessment & Plan





- Diagnosis


(1) Upper GI bleed


Is this a current diagnosis for this admission?: Yes   


Plan: 


Patient now 3 days status post stabilization of acute GI bleed with shock, 

multiple transfusion of packed red cells for bleeding from gastric antral ulcer 

was previously oversewn 1 week prior.





Patient appears to be hemodynamically stable no evidence of bleeding clinically.





Suspect leukocytosis related to stress versus occult infection versus C. 

difficile colitis








Recommendations:





1.  Slowly advance diet to full transfer to floor





2.  Continue p.o. Flagyl for treatment of Clostridium difficile colitis





3.  Spoke to the patient's wife for approximately 23 minutes last p.m. about 

events thus far, management of twice complicated acute peptic ulcer disease, 

and anticipated management in the future, including definitive surgical 

management such as vagotomy and antrectomy with reconstruction if the patient 

has another complication.  She expresses her understanding and agrees to 

proceed.








(2) C. difficile diarrhea


Is this a current diagnosis for this admission?: Yes   





(3) Gastrointestinal bleeding, upper


Is this a current diagnosis for this admission?: Yes   





(4) Hypothyroidism


Is this a current diagnosis for this admission?: Yes   





(5) COPD (chronic obstructive pulmonary disease) with emphysema


Qualifiers: 


   Emphysema type: centrilobular   Qualified Code(s): J43.2 - Centrilobular 

emphysema   


Is this a current diagnosis for this admission?: Yes

## 2018-06-17 LAB
ABSOLUTE LYMPHOCYTES# (MANUAL): 1.2 10^3/UL (ref 0.5–4.7)
ABSOLUTE MONOCYTES # (MANUAL): 1 10^3/UL (ref 0.1–1.4)
ABSOLUTE NEUTROPHILS# (MANUAL): 17.1 10^3/UL (ref 1.7–8.2)
ADD MANUAL DIFF: YES
ANISOCYTOSIS BLD QL SMEAR: SLIGHT
BASO STIPL BLD QL SMEAR: PRESENT
BASOPHILS NFR BLD MANUAL: 0 % (ref 0–2)
EOSINOPHIL NFR BLD MANUAL: 2 % (ref 0–6)
ERYTHROCYTE [DISTWIDTH] IN BLOOD BY AUTOMATED COUNT: 16 % (ref 11.5–14)
HCT VFR BLD CALC: 24.6 % (ref 37.9–51)
HGB BLD-MCNC: 8.1 G/DL (ref 13.5–17)
MCH RBC QN AUTO: 29.1 PG (ref 27–33.4)
MCHC RBC AUTO-ENTMCNC: 33.1 G/DL (ref 32–36)
MCV RBC AUTO: 88 FL (ref 80–97)
MONOCYTES % (MANUAL): 5 % (ref 3–13)
MYELOCYTES % (MANUAL): 1 %
NRBC BLD AUTO-RTO: 1 /100 WBC
OVALOCYTES BLD QL SMEAR: (no result)
PLATELET # BLD: 509 10^3/UL (ref 150–450)
PLATELET COMMENT: (no result)
POIKILOCYTOSIS BLD QL SMEAR: (no result)
POLYCHROMASIA BLD QL SMEAR: SLIGHT
RBC # BLD AUTO: 2.8 10^6/UL (ref 4.35–5.55)
SEGMENTED NEUTROPHILS % (MAN): 86 % (ref 42–78)
TOTAL CELLS COUNTED BLD: 100
VARIANT LYMPHS NFR BLD MANUAL: 6 % (ref 13–45)
WBC # BLD AUTO: 19.6 10^3/UL (ref 4–10.5)

## 2018-06-17 RX ADMIN — LEVOTHYROXINE SODIUM SCH MG: 100 TABLET ORAL at 05:59

## 2018-06-17 RX ADMIN — IPRATROPIUM BROMIDE AND ALBUTEROL SULFATE SCH ML: 2.5; .5 SOLUTION RESPIRATORY (INHALATION) at 19:38

## 2018-06-17 RX ADMIN — SUCRALFATE SCH GM: 1 SUSPENSION ORAL at 16:52

## 2018-06-17 RX ADMIN — PROBIOTIC PRODUCT - TAB SCH MG: TAB at 16:52

## 2018-06-17 RX ADMIN — FENTANYL CITRATE PRN MCG: 50 INJECTION INTRAMUSCULAR; INTRAVENOUS at 00:07

## 2018-06-17 RX ADMIN — LANSOPRAZOLE SCH MG: 30 TABLET, ORALLY DISINTEGRATING, DELAYED RELEASE ORAL at 16:52

## 2018-06-17 RX ADMIN — Medication SCH CAP: at 09:20

## 2018-06-17 RX ADMIN — Medication SCH ML: at 21:15

## 2018-06-17 RX ADMIN — OXYCODONE HYDROCHLORIDE PRN MG: 5 TABLET ORAL at 13:48

## 2018-06-17 RX ADMIN — METRONIDAZOLE SCH MG: 500 TABLET ORAL at 21:14

## 2018-06-17 RX ADMIN — SUCRALFATE SCH GM: 1 SUSPENSION ORAL at 09:19

## 2018-06-17 RX ADMIN — Medication SCH ML: at 13:49

## 2018-06-17 RX ADMIN — METRONIDAZOLE SCH MG: 500 TABLET ORAL at 05:59

## 2018-06-17 RX ADMIN — METRONIDAZOLE SCH MG: 500 TABLET ORAL at 13:49

## 2018-06-17 RX ADMIN — SUCRALFATE SCH GM: 1 SUSPENSION ORAL at 12:44

## 2018-06-17 RX ADMIN — LANSOPRAZOLE SCH MG: 30 TABLET, ORALLY DISINTEGRATING, DELAYED RELEASE ORAL at 06:00

## 2018-06-17 RX ADMIN — Medication SCH ML: at 06:39

## 2018-06-17 RX ADMIN — FENTANYL CITRATE PRN MCG: 50 INJECTION INTRAMUSCULAR; INTRAVENOUS at 06:00

## 2018-06-17 RX ADMIN — SUCRALFATE SCH GM: 1 SUSPENSION ORAL at 21:14

## 2018-06-17 RX ADMIN — OXYCODONE HYDROCHLORIDE PRN MG: 5 TABLET ORAL at 20:28

## 2018-06-17 RX ADMIN — PROBIOTIC PRODUCT - TAB SCH MG: TAB at 09:20

## 2018-06-17 RX ADMIN — OXYCODONE AND ACETAMINOPHEN PRN TAB: 5; 325 TABLET ORAL at 02:50

## 2018-06-17 NOTE — PDOC PROGRESS REPORT
Subjective


Progress Note for:: 06/17/18


Subjective:: 


Continues to improve. No overnight events. Has abdominal pain but decreased in 

intensity. Tolerating liquids. OK with advancing diet to softs. Awaiting 

transfer to the floor. 


Reason For Visit: 


GI BLEED ANEMIA,HYPOTENSION








Physical Exam


Vital Signs: 


 











Temp Pulse Resp BP Pulse Ox


 


 98.1 F   90   18   104/68   97 


 


 06/17/18 04:00  06/17/18 07:31  06/17/18 06:00  06/16/18 19:32  06/16/18 15:00








 Intake & Output











 06/16/18 06/17/18 06/18/18





 06:59 06:59 06:59


 


Intake Total 1917 800 


 


Output Total 2675 1350 


 


Balance -758 -550 


 


Weight 122.9 kg 121.3 kg 











General appearance: PRESENT: no acute distress, cooperative, obese


Mouth exam: PRESENT: moist


Respiratory exam: PRESENT: symmetrical, unlabored


Cardiovascular exam: PRESENT: +S1, +S2.  ABSENT: tachycardia


GI/Abdominal exam: PRESENT: normal bowel sounds, soft, tenderness - epigastric, 

mild


Neurological exam: PRESENT: alert, awake, CN II-XII grossly intact


Psychiatric exam: PRESENT: appropriate affect


Skin exam: PRESENT: dry, intact





Results


Laboratory Results: 


 





 06/17/18 06:00 





 06/16/18 05:08 





 











  06/17/18





  06:00


 


WBC  19.6 H


 


RBC  2.80 L


 


Hgb  8.1 L


 


Hct  24.6 L


 


MCV  88


 


MCH  29.1


 


MCHC  33.1


 


RDW  16.0 H


 


Plt Count  509 H


 


Seg Neutrophils %  Not Reportable


 


Lymphocytes %  Not Reportable


 


Monocytes %  Not Reportable


 


Eosinophils %  Not Reportable


 


Basophils %  Not Reportable


 


Absolute Neutrophils  Not Reportable


 


Absolute Lymphocytes  Not Reportable


 


Absolute Monocytes  Not Reportable


 


Absolute Eosinophils  Not Reportable


 


Absolute Basophils  Not Reportable








 











  06/14/18





  06:56


 


NT-Pro-B Natriuret Pep  60











Impressions: 


 





Chest/Abdomen CTA  06/13/18 09:15


IMPRESSION:  No CT angio evidence of acute pulmonary emboli or thoracic aortic 

dissection


Since the CT exam 6/3/2018, patient has developed a trace left pleural effusion

, bibasilar atelectasis, and retroperitoneal stranding around the pancreatic 

tail which could indicate pancreatitis


 








Chest X-Ray  06/13/18 11:03


IMPRESSION:  No pneumothorax post left jugular line placement, with line tip in 

the superior vena cava.


Stable bandlike atelectasis left lung base


 














Assessment & Plan





- Diagnosis


(1) Gastrointestinal bleeding, upper


Is this a current diagnosis for this admission?: Yes   


Plan: 


Continues to improved. Found to have gastric ulcer s/p 7u pRBC and repair by 

surgery


- H&H stable


- Continue PPI


- Will advance diet


- Surgery following, appreciate continued recs. Per notes, will need definitive 

surgical management at a later time


- Given improvement, transfer orders placed to medicine unit








(2) Anemia


Qualifiers: 


   Anemia type: unspecified type   Qualified Code(s): D64.9 - Anemia, 

unspecified   


Is this a current diagnosis for this admission?: Yes   


Plan: 


Per above. H&H stable. Continue daily CBCs. 








(3) Clostridium difficile colitis


Is this a current diagnosis for this admission?: Yes   


Plan: 


Well controlled on PO Flagyl. Having loose BMs, 1-2


- Will repeat stool study today to help determine treatment course. 








(4) Hypotension


Qualifiers: 


   Hypotension type: unspecified hypotension type   Qualified Code(s): I95.9 - 

Hypotension, unspecified   


Is this a current diagnosis for this admission?: Yes   


Plan: 


Resolved. Continue to monitor BPs per shift. Continuous cardiac monitor 

discontinued. 








(5) Pituitary tumor


Is this a current diagnosis for this admission?: Yes   


Plan: 


Continue Cabergoline twice a week








- Time


Time Spent with patient: Less than 15 minutes


Medications reviewed and adjusted accordingly: Yes


Within: within 48 hours





- Plan Summary


Plan Summary: 


Improving, transfer to floor

## 2018-06-17 NOTE — PDOC PROGRESS REPORT
Subjective


Progress Note for:: 06/17/18


Subjective:: 





patient feels better, full liquid diet tolerated


Reason For Visit: 


GI BLEED ANEMIA,HYPOTENSION








Physical Exam


Vital Signs: 


 











Temp Pulse Resp BP Pulse Ox


 


 98.1 F   90   18   104/68   97 


 


 06/17/18 04:00  06/17/18 07:31  06/17/18 06:00  06/16/18 19:32  06/16/18 15:00








 Intake & Output











 06/16/18 06/17/18 06/18/18





 06:59 06:59 06:59


 


Intake Total 1917 800 


 


Output Total 2675 1350 


 


Balance -758 -550 


 


Weight 122.9 kg 121.3 kg 











General appearance: PRESENT: no acute distress


Mouth exam: PRESENT: moist


Respiratory exam: PRESENT: chest wall tenderness


Cardiovascular exam: PRESENT: RRR


GI/Abdominal exam: PRESENT: normal bowel sounds, other - obese, soft, incision c

/d/i, healerd





Results


Laboratory Results: 


 





 06/17/18 06:00 





 06/16/18 05:08 





 











  06/17/18





  06:00


 


WBC  19.6 H


 


RBC  2.80 L


 


Hgb  8.1 L


 


Hct  24.6 L


 


MCV  88


 


MCH  29.1


 


MCHC  33.1


 


RDW  16.0 H


 


Plt Count  509 H


 


Seg Neutrophils %  Not Reportable


 


Lymphocytes %  Not Reportable


 


Monocytes %  Not Reportable


 


Eosinophils %  Not Reportable


 


Basophils %  Not Reportable


 


Absolute Neutrophils  Not Reportable


 


Absolute Lymphocytes  Not Reportable


 


Absolute Monocytes  Not Reportable


 


Absolute Eosinophils  Not Reportable


 


Absolute Basophils  Not Reportable








 











  06/14/18





  06:56


 


NT-Pro-B Natriuret Pep  60











Impressions: 


 





Chest/Abdomen CTA  06/13/18 09:15


IMPRESSION:  No CT angio evidence of acute pulmonary emboli or thoracic aortic 

dissection


Since the CT exam 6/3/2018, patient has developed a trace left pleural effusion

, bibasilar atelectasis, and retroperitoneal stranding around the pancreatic 

tail which could indicate pancreatitis


 








Chest X-Ray  06/13/18 11:03


IMPRESSION:  No pneumothorax post left jugular line placement, with line tip in 

the superior vena cava.


Stable bandlike atelectasis left lung base


 














Assessment & Plan





- Diagnosis


(1) C. difficile diarrhea


Is this a current diagnosis for this admission?: Yes   





(2) Gastrointestinal bleeding, upper


Is this a current diagnosis for this admission?: Yes   





- Plan Summary


Plan Summary: 





A/





S/p repair perforated DU (6/3/18)


S/p control bleeding DU (6/13/18)


C. Diff. infection under Flagyl tx


patient still has 2 loose stools/day with old blood


Full liquid diet tolerated


WBC still elevated (19k); however, it is trending down








P/





OK to floor by my viewpoint


Soft mechanical diet


stop narcotics, Tylenol only for pain


patient to be discharged once the WBC approaches normal value

## 2018-06-18 LAB
%HYPO/RBC NFR BLD AUTO: SLIGHT %
ABSOLUTE LYMPHOCYTES# (MANUAL): 1.8 10^3/UL (ref 0.5–4.7)
ABSOLUTE LYMPHOCYTES# (MANUAL): 1.9 10^3/UL (ref 0.5–4.7)
ABSOLUTE MONOCYTES # (MANUAL): 0.5 10^3/UL (ref 0.1–1.4)
ABSOLUTE MONOCYTES # (MANUAL): 1.1 10^3/UL (ref 0.1–1.4)
ABSOLUTE NEUTROPHILS# (MANUAL): 11.8 10^3/UL (ref 1.7–8.2)
ABSOLUTE NEUTROPHILS# (MANUAL): 9.3 10^3/UL (ref 1.7–8.2)
ADD MANUAL DIFF: YES
ADD MANUAL DIFF: YES
ANISOCYTOSIS BLD QL SMEAR: (no result)
ANISOCYTOSIS BLD QL SMEAR: SLIGHT
BASO STIPL BLD QL SMEAR: PRESENT
BASOPHILS NFR BLD MANUAL: 0 % (ref 0–2)
BASOPHILS NFR BLD MANUAL: 1 % (ref 0–2)
EOSINOPHIL NFR BLD MANUAL: 3 % (ref 0–6)
EOSINOPHIL NFR BLD MANUAL: 3 % (ref 0–6)
ERYTHROCYTE [DISTWIDTH] IN BLOOD BY AUTOMATED COUNT: 15.7 % (ref 11.5–14)
ERYTHROCYTE [DISTWIDTH] IN BLOOD BY AUTOMATED COUNT: 16 % (ref 11.5–14)
HCT VFR BLD CALC: 21.5 % (ref 37.9–51)
HCT VFR BLD CALC: 23 % (ref 37.9–51)
HGB BLD-MCNC: 7.2 G/DL (ref 13.5–17)
HGB BLD-MCNC: 7.6 G/DL (ref 13.5–17)
MCH RBC QN AUTO: 29.2 PG (ref 27–33.4)
MCH RBC QN AUTO: 29.5 PG (ref 27–33.4)
MCHC RBC AUTO-ENTMCNC: 33.3 G/DL (ref 32–36)
MCHC RBC AUTO-ENTMCNC: 33.4 G/DL (ref 32–36)
MCV RBC AUTO: 88 FL (ref 80–97)
MCV RBC AUTO: 89 FL (ref 80–97)
METAMYELOCYTES % (MANUAL): 2 %
MONOCYTES % (MANUAL): 4 % (ref 3–13)
MONOCYTES % (MANUAL): 7 % (ref 3–13)
MYELOCYTES % (MANUAL): 2 %
NEUTS BAND NFR BLD MANUAL: 2 % (ref 3–5)
OVALOCYTES BLD QL SMEAR: (no result)
OVALOCYTES BLD QL SMEAR: SLIGHT
PLATELET # BLD: 470 10^3/UL (ref 150–450)
PLATELET # BLD: 486 10^3/UL (ref 150–450)
PLATELET COMMENT: (no result)
PLATELET COMMENT: (no result)
POIKILOCYTOSIS BLD QL SMEAR: (no result)
POIKILOCYTOSIS BLD QL SMEAR: SLIGHT
POLYCHROMASIA BLD QL SMEAR: (no result)
POLYCHROMASIA BLD QL SMEAR: SLIGHT
RBC # BLD AUTO: 2.46 10^6/UL (ref 4.35–5.55)
RBC # BLD AUTO: 2.59 10^6/UL (ref 4.35–5.55)
SEGMENTED NEUTROPHILS % (MAN): 71 % (ref 42–78)
SEGMENTED NEUTROPHILS % (MAN): 77 % (ref 42–78)
TOTAL CELLS COUNTED BLD: 100
TOTAL CELLS COUNTED BLD: 100
VARIANT LYMPHS NFR BLD MANUAL: 12 % (ref 13–45)
VARIANT LYMPHS NFR BLD MANUAL: 16 % (ref 13–45)
WBC # BLD AUTO: 12.1 10^3/UL (ref 4–10.5)
WBC # BLD AUTO: 15.3 10^3/UL (ref 4–10.5)

## 2018-06-18 RX ADMIN — SUCRALFATE SCH GM: 1 SUSPENSION ORAL at 15:17

## 2018-06-18 RX ADMIN — METRONIDAZOLE SCH MG: 500 TABLET ORAL at 06:00

## 2018-06-18 RX ADMIN — LEVOTHYROXINE SODIUM SCH MG: 100 TABLET ORAL at 05:59

## 2018-06-18 RX ADMIN — PROBIOTIC PRODUCT - TAB SCH MG: TAB at 09:47

## 2018-06-18 RX ADMIN — SUCRALFATE SCH GM: 1 SUSPENSION ORAL at 11:27

## 2018-06-18 RX ADMIN — METRONIDAZOLE SCH MG: 500 TABLET ORAL at 21:29

## 2018-06-18 RX ADMIN — OXYCODONE HYDROCHLORIDE PRN MG: 5 TABLET ORAL at 06:00

## 2018-06-18 RX ADMIN — SUCRALFATE SCH GM: 1 SUSPENSION ORAL at 07:58

## 2018-06-18 RX ADMIN — Medication SCH ML: at 06:05

## 2018-06-18 RX ADMIN — Medication SCH CAP: at 09:47

## 2018-06-18 RX ADMIN — METRONIDAZOLE SCH MG: 500 TABLET ORAL at 15:16

## 2018-06-18 RX ADMIN — OXYCODONE HYDROCHLORIDE PRN MG: 5 TABLET ORAL at 19:26

## 2018-06-18 RX ADMIN — IPRATROPIUM BROMIDE AND ALBUTEROL SULFATE SCH ML: 2.5; .5 SOLUTION RESPIRATORY (INHALATION) at 15:41

## 2018-06-18 RX ADMIN — Medication SCH ML: at 15:25

## 2018-06-18 RX ADMIN — TRAMADOL HYDROCHLORIDE PRN MG: 50 TABLET, FILM COATED ORAL at 21:34

## 2018-06-18 RX ADMIN — IPRATROPIUM BROMIDE AND ALBUTEROL SULFATE SCH ML: 2.5; .5 SOLUTION RESPIRATORY (INHALATION) at 19:32

## 2018-06-18 RX ADMIN — SUCRALFATE SCH GM: 1 SUSPENSION ORAL at 21:29

## 2018-06-18 RX ADMIN — TRAMADOL HYDROCHLORIDE PRN MG: 50 TABLET, FILM COATED ORAL at 12:19

## 2018-06-18 RX ADMIN — LANSOPRAZOLE SCH MG: 30 TABLET, ORALLY DISINTEGRATING, DELAYED RELEASE ORAL at 17:55

## 2018-06-18 RX ADMIN — PROBIOTIC PRODUCT - TAB SCH MG: TAB at 17:55

## 2018-06-18 RX ADMIN — Medication SCH ML: at 21:44

## 2018-06-18 RX ADMIN — LANSOPRAZOLE SCH MG: 30 TABLET, ORALLY DISINTEGRATING, DELAYED RELEASE ORAL at 06:00

## 2018-06-18 RX ADMIN — IPRATROPIUM BROMIDE AND ALBUTEROL SULFATE SCH ML: 2.5; .5 SOLUTION RESPIRATORY (INHALATION) at 12:14

## 2018-06-18 RX ADMIN — IPRATROPIUM BROMIDE AND ALBUTEROL SULFATE SCH ML: 2.5; .5 SOLUTION RESPIRATORY (INHALATION) at 08:30

## 2018-06-18 NOTE — RADIOLOGY REPORT (SQ)
EXAM DESCRIPTION:  U/S ABDOMEN LIMITED W/O DOP



COMPLETED DATE/TIME:  6/18/2018 3:03 pm



REASON FOR STUDY:  abdominal pain post perforated bowel- eval for free fluid



COMPARISON:  None.



TECHNIQUE:  Dynamic and static grayscale images acquired of the abdomen and recorded on PACS. Jilliano
tri selected color Doppler and spectral images recorded.



LIMITATIONS:  None.



FINDINGS:  A small amount of free fluid is seen in the left lower quadrant and right lower quadrant.



IMPRESSION:  Small amount nonspecific free fluid in lower abdomen.



TECHNICAL DOCUMENTATION:  JOB ID:  [id number]

 2011 [employer]- All Rights Reserved



Reading location - IP/workstation name: [email or web address]

## 2018-06-18 NOTE — PDOC PROGRESS REPORT
Subjective


Progress Note for:: 06/18/18


Subjective:: 





c/o left sided abdominal pain. food tolerated, flatus present


Reason For Visit: 


GI BLEED ANEMIA,HYPOTENSION








Physical Exam


Vital Signs: 


 











Temp Pulse Resp BP Pulse Ox


 


 98.8 F   70   12   114/69   99 


 


 06/18/18 07:23  06/18/18 08:30  06/18/18 08:30  06/18/18 07:23  06/18/18 08:30








 Intake & Output











 06/17/18 06/18/18 06/19/18





 06:59 06:59 06:59


 


Intake Total 800 1787 


 


Output Total 1350 500 


 


Balance -550 1287 


 


Weight 121.3 kg 119.2 kg 











GI/Abdominal exam: PRESENT: soft, tenderness - left side





Results


Laboratory Results: 


 





 06/18/18 06:05 





 06/16/18 05:08 





 











  06/18/18





  06:05


 


WBC  15.3 H


 


RBC  2.59 L


 


Hgb  7.6 L


 


Hct  23.0 L


 


MCV  89


 


MCH  29.5


 


MCHC  33.3


 


RDW  16.0 H


 


Plt Count  486 H


 


Seg Neutrophils %  Not Reportable


 


Lymphocytes %  Not Reportable


 


Monocytes %  Not Reportable


 


Eosinophils %  Not Reportable


 


Basophils %  Not Reportable


 


Absolute Neutrophils  Not Reportable


 


Absolute Lymphocytes  Not Reportable


 


Absolute Monocytes  Not Reportable


 


Absolute Eosinophils  Not Reportable


 


Absolute Basophils  Not Reportable








 











  06/14/18





  06:56


 


NT-Pro-B Natriuret Pep  60











Impressions: 


 





Chest/Abdomen CTA  06/13/18 09:15


IMPRESSION:  No CT angio evidence of acute pulmonary emboli or thoracic aortic 

dissection


Since the CT exam 6/3/2018, patient has developed a trace left pleural effusion

, bibasilar atelectasis, and retroperitoneal stranding around the pancreatic 

tail which could indicate pancreatitis


 








Chest X-Ray  06/13/18 11:03


IMPRESSION:  No pneumothorax post left jugular line placement, with line tip in 

the superior vena cava.


Stable bandlike atelectasis left lung base


 














Assessment & Plan





- Diagnosis


(1) C. difficile diarrhea


Is this a current diagnosis for this admission?: Yes   





(2) Gastrointestinal bleeding, upper


Is this a current diagnosis for this admission?: Yes   





- Plan Summary


Plan Summary: 





A/





S/p repair perforated DU (6/3/18)


S/p control bleeding DU (6/13/18)


C. Diff. infection under Flagyl tx


patient has flatus and stools


Low residue liquid diet tolerated


WBC it is trending down


Left sided abdominal pain





P/





US left abdomen to r/o fluid collection

## 2018-06-18 NOTE — RADIOLOGY REPORT (SQ)
EXAM DESCRIPTION:  KUB/ABDOMEN (SINGLE VIEW)



COMPLETED DATE/TIME:  6/18/2018 2:44 pm



REASON FOR STUDY:  Abdominal pain



COMPARISON:  CT abdomen pelvis 6/3/2018

CT chest 6/13/2018, chest films 6/13/2018



NUMBER OF VIEWS:  One view.



TECHNIQUE:   Supine radiographic image of the abdomen acquired.



LIMITATIONS:  None.



FINDINGS:  BOWEL GAS PATTERN: Grossly normal bowel gas pattern.  There is an endoscopically placed va
scular occlusion clip in cecum/ascending colon.  These are not MRI compatible.  Prior any MRI exam KU
B should be performed to see if this metallic clip has passed from the GI tract.

CALCIFICATIONS: No suspicious calcifications.

SOFT TISSUES: No gross mass or suggestion of organomegaly.

HARDWARE: As above

BONES: Diffuse spondylotic change throughout the thoracic and lumbar spine.  Old bilateral rib fractu
res

OTHER: There is dense left basilar consolidation just above the hemidiaphragm atelectasis versus pneu
monia.

Prior CT angio chest 6/13/2018 demonstrated stranding around the tail of the pancreas in the left upp
er quadrant retroperitoneum.  This could be pancreatitis.  Clinical correlation recommended.



IMPRESSION:  Nonobstructive bowel gas pattern.

Left basilar consolidation atelectasis versus pneumonia

Endoscopically placed clip in the cecum.  This clip has the appearance of a vascular occlusion clip w
hich is not MR compatible.  Prior performing any MRI in this patient, KUB should be obtained to demon
strate that this has passed from the bowel lumen

Prior CT angio chest 6/13/2018 demonstrated inflammation in the retroperitoneum along the tail of the
 pancreas.  Question pancreatitis.



TECHNICAL DOCUMENTATION:  JOB ID:  [id number]

 2011 [employer]- All Rights Reserved



Reading location - IP/workstation name: [facility]

## 2018-06-18 NOTE — PDOC PROGRESS REPORT
Subjective


Progress Note for:: 06/18/18


Subjective:: 





47-year-old gentleman with a past medical history of pituitary adenoma, 

hypothyroidism, nicotine dependence, COPD, and obstructive sleep apnea. 

Recently discarged on June 11 for perforated gastric ulcer repair, and 

treatment of C diff diarrhea. Represented on June 13 with melena and severe 

anemia. Received 3 units of PRBC. A small visible vessel was treated on EGD. A 

total of 7 units of PRBCs have been given. 


An elevated TSH was found, and his synthroid was increased to 250mcg.  Surgery 

is following. Today he complained of continued abdominal pain. US of abdomen 

was ordered. KUB was ordered. 





Reason For Visit: 


GI BLEED ANEMIA,HYPOTENSION








Physical Exam


Vital Signs: 


 











Temp Pulse Resp BP Pulse Ox


 


 98.6 F   70   20   113/58 L  100 


 


 06/18/18 19:18  06/18/18 19:18  06/18/18 19:18  06/18/18 19:18  06/18/18 19:18








 Intake & Output











 06/17/18 06/18/18 06/19/18





 06:59 06:59 06:59


 


Intake Total 800 1787 683


 


Output Total 1350 500 


 


Balance -550 1287 683


 


Weight 121.3 kg 119.2 kg 











General appearance: PRESENT: no acute distress, cooperative


Head exam: PRESENT: atraumatic, normocephalic


Eye exam: PRESENT: EOMI, PERRLA


Ear exam: ABSENT: drainage, normal external ear exam


Mouth exam: PRESENT: moist, neck supple


Throat exam: ABSENT: tonsillar erythema, tonsillar exudate


Neck exam: PRESENT: JVD.  ABSENT: carotid bruit, full ROM


Respiratory exam: ABSENT: rales, rhonchi, wheezes


Cardiovascular exam: PRESENT: RRR, +S1, +S2


Pulses: PRESENT: normal radial pulses, normal dorsalis pedis pul


Vascular exam: PRESENT: normal capillary refill.  ABSENT: pallor


GI/Abdominal exam: PRESENT: normal bowel sounds, soft, tenderness - diffusely 

tender.  ABSENT: rigid


Extremities exam: PRESENT: full ROM.  ABSENT: clubbing, pedal edema


Musculoskeletal exam: PRESENT: full ROM.  ABSENT: ambulatory


Neurological exam: PRESENT: alert, oriented to person, oriented to place, 

oriented to time, oriented to situation


Psychiatric exam: ABSENT: agitated, anxious, unusual affect


Focused psych exam: ABSENT: delusional, paranoid


Skin exam: PRESENT: normal color.  ABSENT: mottled





Results


Laboratory Results: 


 





 06/18/18 16:50 





 06/16/18 05:08 





 











  06/18/18 06/18/18 06/18/18





  06:05 16:50 21:40


 


WBC  15.3 H  12.1 H 


 


RBC  2.59 L  2.46 L 


 


Hgb  7.6 L  7.2 L 


 


Hct  23.0 L  21.5 L 


 


MCV  89  88 


 


MCH  29.5  29.2 


 


MCHC  33.3  33.4 


 


RDW  16.0 H  15.7 H 


 


Plt Count  486 H  470 H 


 


Seg Neutrophils %  Not Reportable  Not Reportable 


 


Lymphocytes %  Not Reportable  Not Reportable 


 


Monocytes %  Not Reportable  Not Reportable 


 


Eosinophils %  Not Reportable  Not Reportable 


 


Basophils %  Not Reportable  Not Reportable 


 


Absolute Neutrophils  Not Reportable  Not Reportable 


 


Absolute Lymphocytes  Not Reportable  Not Reportable 


 


Absolute Monocytes  Not Reportable  Not Reportable 


 


Absolute Eosinophils  Not Reportable  Not Reportable 


 


Absolute Basophils  Not Reportable  Not Reportable 


 


Stool Occult Blood    POSITIVE








 











  06/14/18





  06:56


 


NT-Pro-B Natriuret Pep  60











Impressions: 


 





Chest/Abdomen CTA  06/13/18 09:15


IMPRESSION:  No CT angio evidence of acute pulmonary emboli or thoracic aortic 

dissection


Since the CT exam 6/3/2018, patient has developed a trace left pleural effusion

, bibasilar atelectasis, and retroperitoneal stranding around the pancreatic 

tail which could indicate pancreatitis


 








Chest X-Ray  06/13/18 11:03


IMPRESSION:  No pneumothorax post left jugular line placement, with line tip in 

the superior vena cava.


Stable bandlike atelectasis left lung base


 








KUB X-Ray  06/18/18 00:00


IMPRESSION:  Nonobstructive bowel gas pattern.


Left basilar consolidation atelectasis versus pneumonia


Endoscopically placed clip in the cecum.  This clip has the appearance of a 

vascular occlusion clip which is not MR compatible.  Prior performing any MRI 

in this patient, KUB should be obtained to demonstrate that this has passed 

from the bowel lumen


Prior CT angio chest 6/13/2018 demonstrated inflammation in the retroperitoneum 

along the tail of the pancreas.  Question pancreatitis.


 








Abdomen Ultrasound  06/18/18 11:50


IMPRESSION:  Small amount nonspecific free fluid in lower abdomen.


 














Assessment & Plan





- Diagnosis


(1) Gastrointestinal bleeding, upper


Is this a current diagnosis for this admission?: Yes   


Plan: 


s/p egd and intervention to exposed vessel with gi bleeding. 


gradual decline in Hgb on labs to 7.2. Will transfuse one additional unit of 

blood today. 


continue PPI. No active Bowel movement described recently, denies nausea or 

vomiting. 








(2) Anemia


Qualifiers: 


   Anemia type: unspecified type   Qualified Code(s): D64.9 - Anemia, 

unspecified   


Is this a current diagnosis for this admission?: Yes   


Plan: 


monitor closely. He has received 7u of prbc this admission so far. 








(3) C. difficile diarrhea


Is this a current diagnosis for this admission?: Yes   


Plan: 


Improving WBC and improved stooling frequency on Flagyl. Continue. 








(4) Upper GI bleed


Is this a current diagnosis for this admission?: Yes   


Plan: 


No clinical signs of continued GI bleeding, but Hgb has been consistently 

decreasing. 


Will give 1 u of PRBC today. 








(5) Abdominal pain


Is this a current diagnosis for this admission?: Yes   


Plan: 


checking US of abdomen and KUB. 


Abdominal pain is diffuse. 








- Time


Time Spent with patient: 15-24 minutes





- Inpatient Certification


I certify that my determination is in accordance with my understanding of 

Medicare's requirements for reasonable and necessary INPATIENT services [42 CFR 

412.3e].: Yes


Medical Necessity: Need Close Monitoring Due to Risk of Patient Decompensation

## 2018-06-19 LAB
ABSOLUTE LYMPHOCYTES# (MANUAL): 1.2 10^3/UL (ref 0.5–4.7)
ABSOLUTE MONOCYTES # (MANUAL): 0.4 10^3/UL (ref 0.1–1.4)
ABSOLUTE NEUTROPHILS# (MANUAL): 8.8 10^3/UL (ref 1.7–8.2)
ADD MANUAL DIFF: YES
ALBUMIN SERPL-MCNC: 2.5 G/DL (ref 3.5–5)
ANION GAP SERPL CALC-SCNC: 5 MMOL/L (ref 5–19)
ANISOCYTOSIS BLD QL SMEAR: SLIGHT
BASO STIPL BLD QL SMEAR: PRESENT
BASOPHILS NFR BLD MANUAL: 0 % (ref 0–2)
BUN SERPL-MCNC: 6 MG/DL (ref 7–20)
CALCIUM: 8.5 MG/DL (ref 8.4–10.2)
CHLORIDE SERPL-SCNC: 106 MMOL/L (ref 98–107)
CO2 SERPL-SCNC: 33 MMOL/L (ref 22–30)
EOSINOPHIL NFR BLD MANUAL: 6 % (ref 0–6)
ERYTHROCYTE [DISTWIDTH] IN BLOOD BY AUTOMATED COUNT: 15.4 % (ref 11.5–14)
ERYTHROCYTE [DISTWIDTH] IN BLOOD BY AUTOMATED COUNT: 15.9 % (ref 11.5–14)
ERYTHROCYTE [DISTWIDTH] IN BLOOD BY AUTOMATED COUNT: 16 % (ref 11.5–14)
GLUCOSE SERPL-MCNC: 94 MG/DL (ref 75–110)
HCT VFR BLD CALC: 22.1 % (ref 37.9–51)
HCT VFR BLD CALC: 24.5 % (ref 37.9–51)
HCT VFR BLD CALC: 25.3 % (ref 37.9–51)
HGB BLD-MCNC: 7.3 G/DL (ref 13.5–17)
HGB BLD-MCNC: 8.2 G/DL (ref 13.5–17)
HGB BLD-MCNC: 8.5 G/DL (ref 13.5–17)
MCH RBC QN AUTO: 28.9 PG (ref 27–33.4)
MCH RBC QN AUTO: 29.5 PG (ref 27–33.4)
MCH RBC QN AUTO: 29.9 PG (ref 27–33.4)
MCHC RBC AUTO-ENTMCNC: 32.9 G/DL (ref 32–36)
MCHC RBC AUTO-ENTMCNC: 33.5 G/DL (ref 32–36)
MCHC RBC AUTO-ENTMCNC: 33.5 G/DL (ref 32–36)
MCV RBC AUTO: 88 FL (ref 80–97)
MCV RBC AUTO: 88 FL (ref 80–97)
MCV RBC AUTO: 89 FL (ref 80–97)
MONOCYTES % (MANUAL): 4 % (ref 3–13)
NEUTS BAND NFR BLD MANUAL: 2 % (ref 3–5)
OVALOCYTES BLD QL SMEAR: SLIGHT
PHOSPHATE SERPL-MCNC: 4.1 MG/DL (ref 2.5–4.5)
PLATELET # BLD: 479 10^3/UL (ref 150–450)
PLATELET # BLD: 482 10^3/UL (ref 150–450)
PLATELET # BLD: 507 10^3/UL (ref 150–450)
PLATELET COMMENT: (no result)
POIKILOCYTOSIS BLD QL SMEAR: SLIGHT
POLYCHROMASIA BLD QL SMEAR: (no result)
POTASSIUM SERPL-SCNC: 3.4 MMOL/L (ref 3.6–5)
RBC # BLD AUTO: 2.52 10^6/UL (ref 4.35–5.55)
RBC # BLD AUTO: 2.73 10^6/UL (ref 4.35–5.55)
RBC # BLD AUTO: 2.88 10^6/UL (ref 4.35–5.55)
SEGMENTED NEUTROPHILS % (MAN): 77 % (ref 42–78)
SODIUM SERPL-SCNC: 144.4 MMOL/L (ref 137–145)
TOTAL CELLS COUNTED BLD: 100
VARIANT LYMPHS NFR BLD MANUAL: 11 % (ref 13–45)
WBC # BLD AUTO: 11.1 10^3/UL (ref 4–10.5)
WBC # BLD AUTO: 11.2 10^3/UL (ref 4–10.5)
WBC # BLD AUTO: 11.4 10^3/UL (ref 4–10.5)

## 2018-06-19 PROCEDURE — 30233N1 TRANSFUSION OF NONAUTOLOGOUS RED BLOOD CELLS INTO PERIPHERAL VEIN, PERCUTANEOUS APPROACH: ICD-10-PCS | Performed by: INTERNAL MEDICINE

## 2018-06-19 RX ADMIN — OXYCODONE HYDROCHLORIDE PRN MG: 5 TABLET ORAL at 11:34

## 2018-06-19 RX ADMIN — Medication SCH ML: at 05:29

## 2018-06-19 RX ADMIN — SUCRALFATE SCH GM: 1 SUSPENSION ORAL at 21:42

## 2018-06-19 RX ADMIN — PROBIOTIC PRODUCT - TAB SCH MG: TAB at 11:23

## 2018-06-19 RX ADMIN — METRONIDAZOLE SCH MG: 500 TABLET ORAL at 05:27

## 2018-06-19 RX ADMIN — IPRATROPIUM BROMIDE AND ALBUTEROL SULFATE SCH ML: 2.5; .5 SOLUTION RESPIRATORY (INHALATION) at 16:39

## 2018-06-19 RX ADMIN — OXYCODONE HYDROCHLORIDE PRN MG: 5 TABLET ORAL at 02:47

## 2018-06-19 RX ADMIN — SUCRALFATE SCH GM: 1 SUSPENSION ORAL at 11:22

## 2018-06-19 RX ADMIN — Medication SCH ML: at 21:46

## 2018-06-19 RX ADMIN — SUCRALFATE SCH GM: 1 SUSPENSION ORAL at 08:57

## 2018-06-19 RX ADMIN — Medication SCH ML: at 14:00

## 2018-06-19 RX ADMIN — HYDROMORPHONE HYDROCHLORIDE PRN MG: 2 INJECTION INTRAMUSCULAR; INTRAVENOUS; SUBCUTANEOUS at 17:53

## 2018-06-19 RX ADMIN — LANSOPRAZOLE SCH MG: 30 TABLET, ORALLY DISINTEGRATING, DELAYED RELEASE ORAL at 05:27

## 2018-06-19 RX ADMIN — IPRATROPIUM BROMIDE AND ALBUTEROL SULFATE SCH ML: 2.5; .5 SOLUTION RESPIRATORY (INHALATION) at 20:19

## 2018-06-19 RX ADMIN — METRONIDAZOLE SCH MG: 500 TABLET ORAL at 14:03

## 2018-06-19 RX ADMIN — IPRATROPIUM BROMIDE AND ALBUTEROL SULFATE SCH ML: 2.5; .5 SOLUTION RESPIRATORY (INHALATION) at 12:01

## 2018-06-19 RX ADMIN — IPRATROPIUM BROMIDE AND ALBUTEROL SULFATE SCH ML: 2.5; .5 SOLUTION RESPIRATORY (INHALATION) at 08:33

## 2018-06-19 RX ADMIN — PROBIOTIC PRODUCT - TAB SCH MG: TAB at 17:52

## 2018-06-19 RX ADMIN — LEVOTHYROXINE SODIUM SCH MG: 100 TABLET ORAL at 05:26

## 2018-06-19 RX ADMIN — Medication SCH MG: at 17:52

## 2018-06-19 RX ADMIN — SUCRALFATE SCH GM: 1 SUSPENSION ORAL at 15:51

## 2018-06-19 RX ADMIN — METRONIDAZOLE SCH ML: 500 INJECTION, SOLUTION INTRAVENOUS at 21:43

## 2018-06-19 RX ADMIN — HYDROMORPHONE HYDROCHLORIDE PRN MG: 2 INJECTION INTRAMUSCULAR; INTRAVENOUS; SUBCUTANEOUS at 14:05

## 2018-06-19 RX ADMIN — Medication SCH MG: at 23:40

## 2018-06-19 RX ADMIN — Medication SCH CAP: at 11:23

## 2018-06-19 NOTE — PDOC PROGRESS REPORT
Subjective


Progress Note for:: 06/19/18


Subjective:: 





comfortable, tolerating po well, normal defecation and urination, denies 

abdominal pain


Reason For Visit: 


GI BLEED ANEMIA,HYPOTENSION








Physical Exam


Vital Signs: 


 











Temp Pulse Resp BP Pulse Ox


 


 99.1 F   62   16   121/68   98 


 


 06/19/18 15:14  06/19/18 16:39  06/19/18 16:39  06/19/18 15:14  06/19/18 16:39








 Intake & Output











 06/18/18 06/19/18 06/20/18





 06:59 06:59 06:59


 


Intake Total 1787 1263 887


 


Output Total 500 0 


 


Balance 1287 1263 887


 


Weight 119.2 kg 118.4 kg 











General appearance: PRESENT: no acute distress, cooperative


Respiratory exam: PRESENT: clear to auscultation dorothy


Cardiovascular exam: PRESENT: RRR


GI/Abdominal exam: PRESENT: normal bowel sounds, soft





Results


Laboratory Results: 


 





 06/19/18 12:05 





 06/19/18 08:15 





 











  06/18/18 06/19/18 06/19/18





  21:40 01:00 02:45


 


WBC    11.4 H


 


RBC    2.52 L


 


Hgb    7.3 L


 


Hct    22.1 L


 


MCV    88


 


MCH    28.9


 


MCHC    32.9


 


RDW    16.0 H


 


Plt Count    482 H


 


Seg Neutrophils %   


 


Lymphocytes %   


 


Monocytes %   


 


Eosinophils %   


 


Basophils %   


 


Absolute Neutrophils   


 


Absolute Lymphocytes   


 


Absolute Monocytes   


 


Absolute Eosinophils   


 


Absolute Basophils   


 


Sodium   


 


Potassium   


 


Chloride   


 


Carbon Dioxide   


 


Anion Gap   


 


BUN   


 


Creatinine   


 


Est GFR ( Amer)   


 


Est GFR (Non-Af Amer)   


 


Glucose   


 


Calcium   


 


Phosphorus   


 


Magnesium   


 


Albumin   


 


Lipase   


 


Stool Occult Blood  POSITIVE  


 


Blood Type   O POSITIVE 


 


Antibody Screen   NEGATIVE 














  06/19/18 06/19/18 06/19/18





  08:15 08:15 08:15


 


WBC  11.2 H  


 


RBC  2.88 L  


 


Hgb  8.5 L  


 


Hct  25.3 L  


 


MCV  88  


 


MCH  29.5  


 


MCHC  33.5  


 


RDW  15.4 H  


 


Plt Count  507 H  


 


Seg Neutrophils %  Not Reportable  


 


Lymphocytes %  Not Reportable  


 


Monocytes %  Not Reportable  


 


Eosinophils %  Not Reportable  


 


Basophils %  Not Reportable  


 


Absolute Neutrophils  Not Reportable  


 


Absolute Lymphocytes  Not Reportable  


 


Absolute Monocytes  Not Reportable  


 


Absolute Eosinophils  Not Reportable  


 


Absolute Basophils  Not Reportable  


 


Sodium   144.4 


 


Potassium   3.4 L 


 


Chloride   106 


 


Carbon Dioxide   33 H 


 


Anion Gap   5 


 


BUN   6 L 


 


Creatinine   0.75 


 


Est GFR ( Amer)   > 60 


 


Est GFR (Non-Af Amer)   > 60 


 


Glucose   94 


 


Calcium   8.5 


 


Phosphorus   4.1 


 


Magnesium   2.2 


 


Albumin   2.5 L 


 


Lipase    403.3 H


 


Stool Occult Blood   


 


Blood Type   


 


Antibody Screen   














  06/19/18





  12:05


 


WBC  11.1 H


 


RBC  2.73 L


 


Hgb  8.2 L


 


Hct  24.5 L


 


MCV  89


 


MCH  29.9


 


MCHC  33.5


 


RDW  15.9 H


 


Plt Count  479 H


 


Seg Neutrophils % 


 


Lymphocytes % 


 


Monocytes % 


 


Eosinophils % 


 


Basophils % 


 


Absolute Neutrophils 


 


Absolute Lymphocytes 


 


Absolute Monocytes 


 


Absolute Eosinophils 


 


Absolute Basophils 


 


Sodium 


 


Potassium 


 


Chloride 


 


Carbon Dioxide 


 


Anion Gap 


 


BUN 


 


Creatinine 


 


Est GFR ( Amer) 


 


Est GFR (Non-Af Amer) 


 


Glucose 


 


Calcium 


 


Phosphorus 


 


Magnesium 


 


Albumin 


 


Lipase 


 


Stool Occult Blood 


 


Blood Type 


 


Antibody Screen 








 











  06/14/18





  06:56


 


NT-Pro-B Natriuret Pep  60











Impressions: 


 





Chest/Abdomen CTA  06/13/18 09:15


IMPRESSION:  No CT angio evidence of acute pulmonary emboli or thoracic aortic 

dissection


Since the CT exam 6/3/2018, patient has developed a trace left pleural effusion

, bibasilar atelectasis, and retroperitoneal stranding around the pancreatic 

tail which could indicate pancreatitis


 








Chest X-Ray  06/13/18 11:03


IMPRESSION:  No pneumothorax post left jugular line placement, with line tip in 

the superior vena cava.


Stable bandlike atelectasis left lung base


 








KUB X-Ray  06/18/18 00:00


IMPRESSION:  Nonobstructive bowel gas pattern.


Left basilar consolidation atelectasis versus pneumonia


Endoscopically placed clip in the cecum.  This clip has the appearance of a 

vascular occlusion clip which is not MR compatible.  Prior performing any MRI 

in this patient, KUB should be obtained to demonstrate that this has passed 

from the bowel lumen


Prior CT angio chest 6/13/2018 demonstrated inflammation in the retroperitoneum 

along the tail of the pancreas.  Question pancreatitis.


 








Abdomen Ultrasound  06/18/18 11:50


IMPRESSION:  Small amount nonspecific free fluid in lower abdomen.


 














Assessment & Plan





- Diagnosis


(1) C. difficile diarrhea


Is this a current diagnosis for this admission?: Yes   





(2) Gastrointestinal bleeding, upper


Is this a current diagnosis for this admission?: Yes   





- Plan Summary


Plan Summary: 





A/





S/P repair perforated DU


S/P endoscopic control  of bleeding DU


Stable H/H


Abdomen soft


good appetite 


Normal defecation and urination








P/





Patient can be discharged to home by the General Surgery viewpoint at anytime


start oral PPI (Prevacid BID)


start Fe with vitamin C BID po x months


Continue Carafate 1 gr po qid x 3 months


Follow up with Surgery [facility] in 2 weeks with MAK Smith


Check CBC in 2 weeks


Repeat EGD in 3 months


Discontinue narcotics as they are not necessary so far after surgery.


Tylenol only for pain.  


Can stop antibiotics by the General Surgery viewpoint

## 2018-06-19 NOTE — PDOC PROGRESS REPORT
Subjective


Progress Note for:: 06/19/18


Subjective:: 





47-year-old gentleman with a past medical history of pituitary adenoma, 

hypothyroidism, nicotine dependence, COPD, and obstructive sleep apnea. 

Recently discarged on June 11 for perforated gastric ulcer repair, and 

treatment of C diff diarrhea. Represented on June 13 with melena and severe 

anemia. Received 3 units of PRBC. A small visible vessel was treated on EGD. A 

total of 7 units of PRBCs have been given. 


An elevated TSH was found, and his synthroid was increased to 250mcg.  Surgery 

is following. Today he complained of continued abdominal pain. US of abdomen 

was ordered. KUB was ordered. 


KUB showed a possible pancreatitis, Lipase was checked and was not found to be 

elevated. US of abdomen had a small amount of fluid in his lower pelvic region 

noted. 


Hgb appears to be stabilizing somewhat. Patient did have melena on the prior 

evening with positive Hemacult testing. 


Due to the wife and patient's request, I did call [facility] today to inquire about 

a possible Transfer. [facility] is willing to take the patient if his GI Bleeding 

becomes more severe, but otherwise agreed that he should be monitored her in 

[place] in his current condition. This was relayed back to the patient, and he 

was satisfied with this plan. 


His wife believes that they were discharged too soon on the prior admission, so 

I would advise to go slow on this discharge. 





Reason For Visit: 


GI BLEED ANEMIA,HYPOTENSION








Physical Exam


Vital Signs: 


 











Temp Pulse Resp BP Pulse Ox


 


 97.3 F   60   17   115/71   96 


 


 06/19/18 19:32  06/19/18 20:19  06/19/18 20:19  06/19/18 19:32  06/19/18 20:19








 Intake & Output











 06/18/18 06/19/18 06/20/18





 06:59 06:59 06:59


 


Intake Total 1787 1263 887


 


Output Total 500 0 


 


Balance 1287 1263 887


 


Weight 119.2 kg 118.4 kg 











General appearance: PRESENT: no acute distress, cooperative


Head exam: PRESENT: atraumatic, normocephalic


Eye exam: PRESENT: EOMI, PERRLA


Ear exam: PRESENT: normal external ear exam.  ABSENT: drainage


Mouth exam: PRESENT: moist, neck supple


Throat exam: ABSENT: tonsillar erythema, tonsillar exudate


Neck exam: ABSENT: tenderness, thyromegaly


Respiratory exam: ABSENT: crackles, rales, rhonchi, wheezes


Cardiovascular exam: PRESENT: RRR, +S1, +S2


Pulses: PRESENT: normal radial pulses, normal dorsalis pedis pul


Vascular exam: PRESENT: normal capillary refill.  ABSENT: pallor


GI/Abdominal exam: PRESENT: normal bowel sounds, soft.  ABSENT: rigid


Extremities exam: ABSENT: joint swelling, pedal edema


Musculoskeletal exam: PRESENT: full ROM.  ABSENT: deformity


Neurological exam: PRESENT: alert, oriented to person, oriented to place, 

oriented to time, oriented to situation, CN II-XII grossly intact


Psychiatric exam: ABSENT: flat affect, manic


Focused psych exam: ABSENT: catatonic, paranoid


Skin exam: PRESENT: normal color.  ABSENT: mottled





Results


Laboratory Results: 


 





 06/19/18 12:05 





 06/19/18 08:15 





 











  06/19/18 06/19/18 06/19/18





  01:00 02:45 08:15


 


WBC   11.4 H  11.2 H


 


RBC   2.52 L  2.88 L


 


Hgb   7.3 L  8.5 L


 


Hct   22.1 L  25.3 L


 


MCV   88  88


 


MCH   28.9  29.5


 


MCHC   32.9  33.5


 


RDW   16.0 H  15.4 H


 


Plt Count   482 H  507 H


 


Seg Neutrophils %    Not Reportable


 


Lymphocytes %    Not Reportable


 


Monocytes %    Not Reportable


 


Eosinophils %    Not Reportable


 


Basophils %    Not Reportable


 


Absolute Neutrophils    Not Reportable


 


Absolute Lymphocytes    Not Reportable


 


Absolute Monocytes    Not Reportable


 


Absolute Eosinophils    Not Reportable


 


Absolute Basophils    Not Reportable


 


Sodium   


 


Potassium   


 


Chloride   


 


Carbon Dioxide   


 


Anion Gap   


 


BUN   


 


Creatinine   


 


Est GFR ( Amer)   


 


Est GFR (Non-Af Amer)   


 


Glucose   


 


Calcium   


 


Phosphorus   


 


Magnesium   


 


Albumin   


 


Lipase   


 


Blood Type  O POSITIVE  


 


Antibody Screen  NEGATIVE  














  06/19/18 06/19/18 06/19/18





  08:15 08:15 12:05


 


WBC    11.1 H


 


RBC    2.73 L


 


Hgb    8.2 L


 


Hct    24.5 L


 


MCV    89


 


MCH    29.9


 


MCHC    33.5


 


RDW    15.9 H


 


Plt Count    479 H


 


Seg Neutrophils %   


 


Lymphocytes %   


 


Monocytes %   


 


Eosinophils %   


 


Basophils %   


 


Absolute Neutrophils   


 


Absolute Lymphocytes   


 


Absolute Monocytes   


 


Absolute Eosinophils   


 


Absolute Basophils   


 


Sodium  144.4  


 


Potassium  3.4 L  


 


Chloride  106  


 


Carbon Dioxide  33 H  


 


Anion Gap  5  


 


BUN  6 L  


 


Creatinine  0.75  


 


Est GFR ( Amer)  > 60  


 


Est GFR (Non-Af Amer)  > 60  


 


Glucose  94  


 


Calcium  8.5  


 


Phosphorus  4.1  


 


Magnesium  2.2  


 


Albumin  2.5 L  


 


Lipase   403.3 H 


 


Blood Type   


 


Antibody Screen   








 











  06/14/18





  06:56


 


NT-Pro-B Natriuret Pep  60











Impressions: 


 





Chest/Abdomen CTA  06/13/18 09:15


IMPRESSION:  No CT angio evidence of acute pulmonary emboli or thoracic aortic 

dissection


Since the CT exam 6/3/2018, patient has developed a trace left pleural effusion

, bibasilar atelectasis, and retroperitoneal stranding around the pancreatic 

tail which could indicate pancreatitis


 








Chest X-Ray  06/13/18 11:03


IMPRESSION:  No pneumothorax post left jugular line placement, with line tip in 

the superior vena cava.


Stable bandlike atelectasis left lung base


 








KUB X-Ray  06/18/18 00:00


IMPRESSION:  Nonobstructive bowel gas pattern.


Left basilar consolidation atelectasis versus pneumonia


Endoscopically placed clip in the cecum.  This clip has the appearance of a 

vascular occlusion clip which is not MR compatible.  Prior performing any MRI 

in this patient, KUB should be obtained to demonstrate that this has passed 

from the bowel lumen


Prior CT angio chest 6/13/2018 demonstrated inflammation in the retroperitoneum 

along the tail of the pancreas.  Question pancreatitis.


 








Abdomen Ultrasound  06/18/18 11:50


IMPRESSION:  Small amount nonspecific free fluid in lower abdomen.


 














Assessment & Plan





- Diagnosis


(1) Gastrointestinal bleeding, upper


Is this a current diagnosis for this admission?: Yes   


Plan: 


Melena reported on prior night, hemacult + 


continue to monitor Hgb. 


stable vitals


continue PPI IV BID, sucralfate. 


Surgery is following. 


If significant bleed occurs, consider [facility].








(2) Anemia


Qualifiers: 


   Anemia type: unspecified type   Qualified Code(s): D64.9 - Anemia, 

unspecified   


Is this a current diagnosis for this admission?: Yes   


Plan: 


continue to monitor Hgb











(3) C. difficile diarrhea


Is this a current diagnosis for this admission?: Yes   


Plan: 


I asked pharmacy about c diff treatment details: 


6/3-6/8 IV flagyl


6/8-6/12, PO Flagyl


6/13 -6/14 PO Vanc


6/13-6/15 IV flagyl


6/15-6/18 PO Flagyl


patient has exceeded a two week course, but is still describing loose stools.


frequency has improved significantly. Will continue antibiotics at present. 








(4) Upper GI bleed


Is this a current diagnosis for this admission?: Yes   


Plan: 


continue to monitor Hgb. 








(5) Abdominal pain


Is this a current diagnosis for this admission?: Yes   


Plan: 


significant improvement in abdominal pain today. 








- Time


Time Spent with patient: 15-24 minutes





- Inpatient Certification


I certify that my determination is in accordance with my understanding of 

Medicare's requirements for reasonable and necessary INPATIENT services [42 CFR 

412.3e].: Yes


Medical Necessity: Need Close Monitoring Due to Risk of Patient Decompensation

## 2018-06-20 VITALS — DIASTOLIC BLOOD PRESSURE: 57 MMHG | SYSTOLIC BLOOD PRESSURE: 114 MMHG

## 2018-06-20 LAB
%HYPO/RBC NFR BLD AUTO: SLIGHT %
ABSOLUTE LYMPHOCYTES# (MANUAL): 1 10^3/UL (ref 0.5–4.7)
ABSOLUTE MONOCYTES # (MANUAL): 0.9 10^3/UL (ref 0.1–1.4)
ABSOLUTE NEUTROPHILS# (MANUAL): 8 10^3/UL (ref 1.7–8.2)
ADD MANUAL DIFF: YES
ALBUMIN SERPL-MCNC: 2.4 G/DL (ref 3.5–5)
ANION GAP SERPL CALC-SCNC: 7 MMOL/L (ref 5–19)
ANISOCYTOSIS BLD QL SMEAR: (no result)
BASO STIPL BLD QL SMEAR: PRESENT
BASOPHILS NFR BLD MANUAL: 0 % (ref 0–2)
BUN SERPL-MCNC: 5 MG/DL (ref 7–20)
CALCIUM: 8.6 MG/DL (ref 8.4–10.2)
CHLORIDE SERPL-SCNC: 107 MMOL/L (ref 98–107)
CO2 SERPL-SCNC: 33 MMOL/L (ref 22–30)
EOSINOPHIL NFR BLD MANUAL: 2 % (ref 0–6)
ERYTHROCYTE [DISTWIDTH] IN BLOOD BY AUTOMATED COUNT: 15.9 % (ref 11.5–14)
GLUCOSE SERPL-MCNC: 96 MG/DL (ref 75–110)
HCT VFR BLD CALC: 25.7 % (ref 37.9–51)
HGB BLD-MCNC: 8.8 G/DL (ref 13.5–17)
MCH RBC QN AUTO: 30.9 PG (ref 27–33.4)
MCHC RBC AUTO-ENTMCNC: 34.3 G/DL (ref 32–36)
MCV RBC AUTO: 90 FL (ref 80–97)
METAMYELOCYTES % (MANUAL): 1 %
MONOCYTES % (MANUAL): 9 % (ref 3–13)
PHOSPHATE SERPL-MCNC: 4.1 MG/DL (ref 2.5–4.5)
PLATELET # BLD: 517 10^3/UL (ref 150–450)
PLATELET COMMENT: (no result)
POLYCHROMASIA BLD QL SMEAR: (no result)
POTASSIUM SERPL-SCNC: 3.6 MMOL/L (ref 3.6–5)
RBC # BLD AUTO: 2.86 10^6/UL (ref 4.35–5.55)
SEGMENTED NEUTROPHILS % (MAN): 78 % (ref 42–78)
SODIUM SERPL-SCNC: 146.8 MMOL/L (ref 137–145)
TOTAL CELLS COUNTED BLD: 100
TOXIC GRANULES BLD QL SMEAR: SLIGHT
VARIANT LYMPHS NFR BLD MANUAL: 10 % (ref 13–45)
WBC # BLD AUTO: 10.1 10^3/UL (ref 4–10.5)

## 2018-06-20 RX ADMIN — Medication SCH ML: at 06:15

## 2018-06-20 RX ADMIN — PROBIOTIC PRODUCT - TAB SCH MG: TAB at 11:54

## 2018-06-20 RX ADMIN — TRAMADOL HYDROCHLORIDE PRN MG: 50 TABLET, FILM COATED ORAL at 01:17

## 2018-06-20 RX ADMIN — SUCRALFATE SCH GM: 1 SUSPENSION ORAL at 12:43

## 2018-06-20 RX ADMIN — TRAMADOL HYDROCHLORIDE PRN MG: 50 TABLET, FILM COATED ORAL at 08:19

## 2018-06-20 RX ADMIN — SUCRALFATE SCH GM: 1 SUSPENSION ORAL at 08:19

## 2018-06-20 RX ADMIN — METRONIDAZOLE SCH ML: 500 INJECTION, SOLUTION INTRAVENOUS at 06:13

## 2018-06-20 RX ADMIN — IPRATROPIUM BROMIDE AND ALBUTEROL SULFATE SCH ML: 2.5; .5 SOLUTION RESPIRATORY (INHALATION) at 08:39

## 2018-06-20 RX ADMIN — Medication SCH MG: at 06:11

## 2018-06-20 RX ADMIN — Medication SCH CAP: at 11:56

## 2018-06-20 RX ADMIN — Medication SCH MG: at 12:43

## 2018-06-20 RX ADMIN — IPRATROPIUM BROMIDE AND ALBUTEROL SULFATE SCH ML: 2.5; .5 SOLUTION RESPIRATORY (INHALATION) at 13:10

## 2018-06-20 RX ADMIN — LEVOTHYROXINE SODIUM SCH MG: 100 TABLET ORAL at 06:11

## 2018-06-20 NOTE — PDOC DISCHARGE SUMMARY
General





- Admit/Disc Date/PCP


Admission Date/Primary Care Provider: 


  06/13/18 16:53





  





Discharge Date: 06/20/18





- Additional Information


Resuscitation Status: Full Code


Discharge Diet: As Tolerated


Discharge Activity: Activity As Tolerated, Balance Activity w/Rest


Prescriptions: 


Ascorbic Acid [Vitamin C 500 mg Tablet] 500 mg PO BID 30 Days #60 tablet


Iron Polysaccharides Complex [Nu-Iron 150 Capsule] 150 mg PO BID 30 Days #60 

capsule


Lansoprazole [Prevacid 30 mg Odt Tablet] 30 mg PO BID@0600,1700 30 Days #60 

tab.rap


Prenatal Vit/Dha [Prenatal Multi + Dha Capsule] 1 cap PO DAILY 30 Days #30 

capsule


Home Medications: 








Cabergoline [Cabergoline 0.5 mg Tablet] 0.25 mg PO TUFR@1000 06/03/18 


Levothyroxine Sodium [Synthroid 0.112 mg Tablet] 0.224 mg PO DAILY 06/03/18 


Oxycodone HCl/Acetaminophen [Percocet 5-325 mg Tablet] 1 tab PO PRN PRN 06/13/ 18 


Ascorbic Acid [Vitamin C 500 mg Tablet] 500 mg PO BID 30 Days #60 tablet 06/20/ 18 


Iron Polysaccharides Complex [Nu-Iron 150 Capsule] 150 mg PO BID 30 Days #60 

capsule 06/20/18 


Lansoprazole [Prevacid 30 mg Odt Tablet] 30 mg PO BID@0600,1700 30 Days #60 

tab.rap 06/20/18 


Prenatal Vit/Dha [Prenatal Multi + Dha Capsule] 1 cap PO DAILY 30 Days #30 

capsule 06/20/18 


Sucralfate [Carafate Susp 1 gm/10 ml Udcup] 1 gm PO ACHS  udc 06/20/18 











Physical Exam


Vital Signs: 


 











Temp Pulse Resp BP Pulse Ox


 


 98.5 F   82   18   114/57 L  93 


 


 06/20/18 12:54  06/20/18 12:54  06/20/18 12:54  06/20/18 12:54  06/20/18 12:54








 Intake & Output











 06/19/18 06/20/18 06/21/18





 06:59 06:59 06:59


 


Intake Total 1263 2992 592


 


Output Total 0  


 


Balance 1263 2992 592


 


Weight 118.4 kg 118.7 kg 














Results


Laboratory Results: 


 





 06/20/18 05:15 





 06/20/18 05:15 





 











  06/20/18 06/20/18





  05:15 05:15


 


WBC  10.1 


 


RBC  2.86 L 


 


Hgb  8.8 L 


 


Hct  25.7 L 


 


MCV  90 


 


MCH  30.9 


 


MCHC  34.3 


 


RDW  15.9 H 


 


Plt Count  517 H 


 


Seg Neutrophils %  Not Reportable 


 


Lymphocytes %  Not Reportable 


 


Monocytes %  Not Reportable 


 


Eosinophils %  Not Reportable 


 


Basophils %  Not Reportable 


 


Absolute Neutrophils  Not Reportable 


 


Absolute Lymphocytes  Not Reportable 


 


Absolute Monocytes  Not Reportable 


 


Absolute Eosinophils  Not Reportable 


 


Absolute Basophils  Not Reportable 


 


Sodium   146.8 H


 


Potassium   3.6


 


Chloride   107


 


Carbon Dioxide   33 H


 


Anion Gap   7


 


BUN   5 L


 


Creatinine   0.80


 


Est GFR ( Amer)   > 60


 


Est GFR (Non-Af Amer)   > 60


 


Glucose   96


 


Calcium   8.6


 


Phosphorus   4.1


 


Albumin   2.4 L








 











  06/14/18





  06:56


 


NT-Pro-B Natriuret Pep  60











Impressions: 


 





Chest/Abdomen CTA  06/13/18 09:15


IMPRESSION:  No CT angio evidence of acute pulmonary emboli or thoracic aortic 

dissection


Since the CT exam 6/3/2018, patient has developed a trace left pleural effusion

, bibasilar atelectasis, and retroperitoneal stranding around the pancreatic 

tail which could indicate pancreatitis


 








Chest X-Ray  06/13/18 11:03


IMPRESSION:  No pneumothorax post left jugular line placement, with line tip in 

the superior vena cava.


Stable bandlike atelectasis left lung base


 








KUB X-Ray  06/18/18 00:00


IMPRESSION:  Nonobstructive bowel gas pattern.


Left basilar consolidation atelectasis versus pneumonia


Endoscopically placed clip in the cecum.  This clip has the appearance of a 

vascular occlusion clip which is not MR compatible.  Prior performing any MRI 

in this patient, KUB should be obtained to demonstrate that this has passed 

from the bowel lumen


Prior CT angio chest 6/13/2018 demonstrated inflammation in the retroperitoneum 

along the tail of the pancreas.  Question pancreatitis.


 








Abdomen Ultrasound  06/18/18 11:50


IMPRESSION:  Small amount nonspecific free fluid in lower abdomen.

## 2018-06-20 NOTE — PDOC DISCHARGE SUMMARY
General





- Admit/Disc Date/PCP


Admission Date/Primary Care Provider: 


  06/13/18 16:53





  





Discharge Date: 06/20/18





- Additional Information


Resuscitation Status: Full Code


Discharge Diet: As Tolerated


Prescriptions: 


Ascorbic Acid [Vitamin C 500 mg Tablet] 500 mg PO BID 30 Days #60 tablet


Iron Polysaccharides Complex [Nu-Iron 150 Capsule] 150 mg PO BID 30 Days #60 

capsule


Lansoprazole [Prevacid 30 mg Odt Tablet] 30 mg PO BID@0600,1700 30 Days #60 

tab.rap


Prenatal Vit/Dha [Prenatal Multi + Dha Capsule] 1 cap PO DAILY 30 Days #30 

capsule


Home Medications: 








Cabergoline [Cabergoline 0.5 mg Tablet] 0.25 mg PO TUFR@1000 06/03/18 


Levothyroxine Sodium [Synthroid 0.112 mg Tablet] 0.224 mg PO DAILY 06/03/18 


Oxycodone HCl/Acetaminophen [Percocet 5-325 mg Tablet] 1 tab PO PRN PRN 06/13/ 18 


Ascorbic Acid [Vitamin C 500 mg Tablet] 500 mg PO BID 30 Days #60 tablet 06/20/ 18 


Iron Polysaccharides Complex [Nu-Iron 150 Capsule] 150 mg PO BID 30 Days #60 

capsule 06/20/18 


Lansoprazole [Prevacid 30 mg Odt Tablet] 30 mg PO BID@0600,1700 30 Days #60 

tab.rap 06/20/18 


Prenatal Vit/Dha [Prenatal Multi + Dha Capsule] 1 cap PO DAILY 30 Days #30 

capsule 06/20/18 


Sucralfate [Carafate Susp 1 gm/10 ml Udcup] 1 gm PO ACHS  udc 06/20/18 











History of Present Illness


History of Present Illness: 


MARK PAYNE is a 47 year old male








Physical Exam


Vital Signs: 


 











Temp Pulse Resp BP Pulse Ox


 


 98.5 F   82   18   132/77 H  93 


 


 06/20/18 10:58  06/20/18 10:58  06/20/18 10:58  06/20/18 10:58  06/20/18 10:58








 Intake & Output











 06/19/18 06/20/18 06/21/18





 06:59 06:59 06:59


 


Intake Total 1263 2992 592


 


Output Total 0  


 


Balance 1263 2992 592


 


Weight 118.4 kg 118.7 kg 














Results


Laboratory Results: 


 





 06/20/18 05:15 





 06/20/18 05:15 





 











  06/19/18 06/20/18 06/20/18





  12:05 05:15 05:15


 


WBC  11.1 H  10.1 


 


RBC  2.73 L  2.86 L 


 


Hgb  8.2 L  8.8 L 


 


Hct  24.5 L  25.7 L 


 


MCV  89  90 


 


MCH  29.9  30.9 


 


MCHC  33.5  34.3 


 


RDW  15.9 H  15.9 H 


 


Plt Count  479 H  517 H 


 


Seg Neutrophils %   Not Reportable 


 


Lymphocytes %   Not Reportable 


 


Monocytes %   Not Reportable 


 


Eosinophils %   Not Reportable 


 


Basophils %   Not Reportable 


 


Absolute Neutrophils   Not Reportable 


 


Absolute Lymphocytes   Not Reportable 


 


Absolute Monocytes   Not Reportable 


 


Absolute Eosinophils   Not Reportable 


 


Absolute Basophils   Not Reportable 


 


Sodium    146.8 H


 


Potassium    3.6


 


Chloride    107


 


Carbon Dioxide    33 H


 


Anion Gap    7


 


BUN    5 L


 


Creatinine    0.80


 


Est GFR ( Amer)    > 60


 


Est GFR (Non-Af Amer)    > 60


 


Glucose    96


 


Calcium    8.6


 


Phosphorus    4.1


 


Albumin    2.4 L








 











  06/14/18





  06:56


 


NT-Pro-B Natriuret Pep  60











Impressions: 


 





Chest/Abdomen CTA  06/13/18 09:15


IMPRESSION:  No CT angio evidence of acute pulmonary emboli or thoracic aortic 

dissection


Since the CT exam 6/3/2018, patient has developed a trace left pleural effusion

, bibasilar atelectasis, and retroperitoneal stranding around the pancreatic 

tail which could indicate pancreatitis


 








Chest X-Ray  06/13/18 11:03


IMPRESSION:  No pneumothorax post left jugular line placement, with line tip in 

the superior vena cava.


Stable bandlike atelectasis left lung base


 








KUB X-Ray  06/18/18 00:00


IMPRESSION:  Nonobstructive bowel gas pattern.


Left basilar consolidation atelectasis versus pneumonia


Endoscopically placed clip in the cecum.  This clip has the appearance of a 

vascular occlusion clip which is not MR compatible.  Prior performing any MRI 

in this patient, KUB should be obtained to demonstrate that this has passed 

from the bowel lumen


Prior CT angio chest 6/13/2018 demonstrated inflammation in the retroperitoneum 

along the tail of the pancreas.  Question pancreatitis.


 








Abdomen Ultrasound  06/18/18 11:50


IMPRESSION:  Small amount nonspecific free fluid in lower abdomen.

## 2018-06-20 NOTE — PDOC PROGRESS REPORT
Subjective


Progress Note for:: 06/20/18


Subjective:: 





This is a 47-year-old male status post laparotomy for a perforated gastric 

ulcer.  He represented with profuse upper GI bleeding.  The patient underwent 

EGD with clipping and cauterization of the visible vessel.  Today, the patient 

reports feeling weak, but denies any hematochezia or hematemesis.  Denies chest 

pain, shortness of breath, orthostasis, blurry vision, headache.


Reason For Visit: 


GI BLEED ANEMIA,HYPOTENSION








Physical Exam


Vital Signs: 


 











Temp Pulse Resp BP Pulse Ox


 


 98.5 F   82   18   114/57 L  93 


 


 06/20/18 12:54  06/20/18 12:54  06/20/18 12:54  06/20/18 12:54  06/20/18 12:54








 Intake & Output











 06/19/18 06/20/18 06/21/18





 06:59 06:59 06:59


 


Intake Total 1263 2992 592


 


Output Total 0  


 


Balance 1263 2992 592


 


Weight 118.4 kg 118.7 kg 











General appearance: PRESENT: no acute distress


Head exam: PRESENT: atraumatic, normocephalic


Eye exam: PRESENT: EOMI, PERRLA.  ABSENT: scleral icterus


Mouth exam: PRESENT: moist, neck supple


Neck exam: ABSENT: lymphadenopathy, meningismus, tenderness, thyromegaly, 

tracheal deviation


Respiratory exam: PRESENT: clear to auscultation dorothy.  ABSENT: chest wall 

tenderness, tachypnea


Cardiovascular exam: PRESENT: RRR


Pulses: PRESENT: normal radial pulses


Vascular exam: PRESENT: pallor.  ABSENT: normal capillary refill


GI/Abdominal exam: PRESENT: soft, other - Incision is clean, dry, intact..  

ABSENT: distended, tenderness


Rectal exam: PRESENT: deferred


Extremities exam: ABSENT: clubbing


Neurological exam: PRESENT: alert, awake, oriented to person, oriented to place

, oriented to time, oriented to situation, CN II-XII grossly intact.  ABSENT: 

motor sensory deficit


Psychiatric exam: ABSENT: agitated, anxious, depressed


Skin exam: PRESENT: pallor.  ABSENT: erythema, jaundice, rash





Results


Laboratory Results: 


 





 06/20/18 05:15 





 06/20/18 05:15 





 











  06/20/18 06/20/18





  05:15 05:15


 


WBC  10.1 


 


RBC  2.86 L 


 


Hgb  8.8 L 


 


Hct  25.7 L 


 


MCV  90 


 


MCH  30.9 


 


MCHC  34.3 


 


RDW  15.9 H 


 


Plt Count  517 H 


 


Seg Neutrophils %  Not Reportable 


 


Lymphocytes %  Not Reportable 


 


Monocytes %  Not Reportable 


 


Eosinophils %  Not Reportable 


 


Basophils %  Not Reportable 


 


Absolute Neutrophils  Not Reportable 


 


Absolute Lymphocytes  Not Reportable 


 


Absolute Monocytes  Not Reportable 


 


Absolute Eosinophils  Not Reportable 


 


Absolute Basophils  Not Reportable 


 


Sodium   146.8 H


 


Potassium   3.6


 


Chloride   107


 


Carbon Dioxide   33 H


 


Anion Gap   7


 


BUN   5 L


 


Creatinine   0.80


 


Est GFR ( Amer)   > 60


 


Est GFR (Non-Af Amer)   > 60


 


Glucose   96


 


Calcium   8.6


 


Phosphorus   4.1


 


Albumin   2.4 L








 











  06/14/18





  06:56


 


NT-Pro-B Natriuret Pep  60











Impressions: 


 





Chest/Abdomen CTA  06/13/18 09:15


IMPRESSION:  No CT angio evidence of acute pulmonary emboli or thoracic aortic 

dissection


Since the CT exam 6/3/2018, patient has developed a trace left pleural effusion

, bibasilar atelectasis, and retroperitoneal stranding around the pancreatic 

tail which could indicate pancreatitis


 








Chest X-Ray  06/13/18 11:03


IMPRESSION:  No pneumothorax post left jugular line placement, with line tip in 

the superior vena cava.


Stable bandlike atelectasis left lung base


 








KUB X-Ray  06/18/18 00:00


IMPRESSION:  Nonobstructive bowel gas pattern.


Left basilar consolidation atelectasis versus pneumonia


Endoscopically placed clip in the cecum.  This clip has the appearance of a 

vascular occlusion clip which is not MR compatible.  Prior performing any MRI 

in this patient, KUB should be obtained to demonstrate that this has passed 

from the bowel lumen


Prior CT angio chest 6/13/2018 demonstrated inflammation in the retroperitoneum 

along the tail of the pancreas.  Question pancreatitis.


 








Abdomen Ultrasound  06/18/18 11:50


IMPRESSION:  Small amount nonspecific free fluid in lower abdomen.


 














Assessment & Plan





- Diagnosis


(1) Anemia


Qualifiers: 


   Anemia type: unspecified type   Qualified Code(s): D64.9 - Anemia, 

unspecified   


Is this a current diagnosis for this admission?: Yes   





(2) Gastrointestinal bleeding, upper


Is this a current diagnosis for this admission?: Yes   





- Plan Summary


Plan Summary: 





This is a 47-year-old male status post EGD for upper GI bleeding.  The patient 

has a stable hemoglobin.  His vital signs are stable.  He denies any 

hematochezia or hematemesis.  The patient is requesting discharge home today.  

I believe that he is medically fit for discharge.  I will write prescriptions 

for iron, PPI, Carafate, and Ultram.  The patient should see me next week with 

a repeat hemoglobin.  I have encouraged the patient and his wife to contact me 

immediately with any new questions or problems.

## 2018-07-17 ENCOUNTER — HOSPITAL ENCOUNTER (OUTPATIENT)
Dept: HOSPITAL 62 - LAB | Age: 48
End: 2018-07-17
Attending: PHYSICIAN ASSISTANT
Payer: SELF-PAY

## 2018-07-17 DIAGNOSIS — K25.5: Primary | ICD-10-CM

## 2018-07-17 DIAGNOSIS — R10.9: ICD-10-CM

## 2018-07-17 LAB
ADD MANUAL DIFF: NO
ALBUMIN SERPL-MCNC: 4.3 G/DL (ref 3.5–5)
ALP SERPL-CCNC: 75 U/L (ref 38–126)
ALT SERPL-CCNC: 43 U/L (ref 21–72)
ANION GAP SERPL CALC-SCNC: 16 MMOL/L (ref 5–19)
AST SERPL-CCNC: 38 U/L (ref 17–59)
BASOPHILS # BLD AUTO: 0.1 10^3/UL (ref 0–0.2)
BASOPHILS NFR BLD AUTO: 1.4 % (ref 0–2)
BILIRUB DIRECT SERPL-MCNC: 0.3 MG/DL (ref 0–0.4)
BILIRUB SERPL-MCNC: 0.4 MG/DL (ref 0.2–1.3)
BUN SERPL-MCNC: 16 MG/DL (ref 7–20)
CALCIUM: 10 MG/DL (ref 8.4–10.2)
CHLORIDE SERPL-SCNC: 110 MMOL/L (ref 98–107)
CO2 SERPL-SCNC: 21 MMOL/L (ref 22–30)
EOSINOPHIL # BLD AUTO: 0.8 10^3/UL (ref 0–0.6)
EOSINOPHIL NFR BLD AUTO: 10.4 % (ref 0–6)
ERYTHROCYTE [DISTWIDTH] IN BLOOD BY AUTOMATED COUNT: 16.5 % (ref 11.5–14)
GLUCOSE SERPL-MCNC: 95 MG/DL (ref 75–110)
HCT VFR BLD CALC: 38.1 % (ref 37.9–51)
HGB BLD-MCNC: 12.8 G/DL (ref 13.5–17)
LYMPHOCYTES # BLD AUTO: 1.5 10^3/UL (ref 0.5–4.7)
LYMPHOCYTES NFR BLD AUTO: 20.5 % (ref 13–45)
MCH RBC QN AUTO: 29.7 PG (ref 27–33.4)
MCHC RBC AUTO-ENTMCNC: 33.6 G/DL (ref 32–36)
MCV RBC AUTO: 88 FL (ref 80–97)
MONOCYTES # BLD AUTO: 0.8 10^3/UL (ref 0.1–1.4)
MONOCYTES NFR BLD AUTO: 11.1 % (ref 3–13)
NEUTROPHILS # BLD AUTO: 4.2 10^3/UL (ref 1.7–8.2)
NEUTS SEG NFR BLD AUTO: 56.6 % (ref 42–78)
PLATELET # BLD: 393 10^3/UL (ref 150–450)
POTASSIUM SERPL-SCNC: 4.4 MMOL/L (ref 3.6–5)
PROT SERPL-MCNC: 7.3 G/DL (ref 6.3–8.2)
RBC # BLD AUTO: 4.32 10^6/UL (ref 4.35–5.55)
SODIUM SERPL-SCNC: 146.5 MMOL/L (ref 137–145)
TOTAL CELLS COUNTED % (AUTO): 100 %
WBC # BLD AUTO: 7.4 10^3/UL (ref 4–10.5)

## 2018-07-17 PROCEDURE — 36415 COLL VENOUS BLD VENIPUNCTURE: CPT

## 2018-07-17 PROCEDURE — 80053 COMPREHEN METABOLIC PANEL: CPT

## 2018-07-17 PROCEDURE — 85025 COMPLETE CBC W/AUTO DIFF WBC: CPT

## 2018-07-24 ENCOUNTER — HOSPITAL ENCOUNTER (OUTPATIENT)
Dept: HOSPITAL 62 - OROUT | Age: 48
Discharge: HOME | End: 2018-07-24
Attending: SURGERY
Payer: SELF-PAY

## 2018-07-24 VITALS — DIASTOLIC BLOOD PRESSURE: 72 MMHG | SYSTOLIC BLOOD PRESSURE: 121 MMHG

## 2018-07-24 DIAGNOSIS — K21.0: ICD-10-CM

## 2018-07-24 DIAGNOSIS — J44.9: ICD-10-CM

## 2018-07-24 DIAGNOSIS — F17.210: ICD-10-CM

## 2018-07-24 DIAGNOSIS — K44.9: ICD-10-CM

## 2018-07-24 DIAGNOSIS — E03.9: ICD-10-CM

## 2018-07-24 DIAGNOSIS — K29.80: ICD-10-CM

## 2018-07-24 DIAGNOSIS — K25.5: Primary | ICD-10-CM

## 2018-07-24 DIAGNOSIS — G47.30: ICD-10-CM

## 2018-07-24 DIAGNOSIS — K29.50: ICD-10-CM

## 2018-07-24 LAB
ERYTHROCYTE [DISTWIDTH] IN BLOOD BY AUTOMATED COUNT: 16.2 % (ref 11.5–14)
HCT VFR BLD CALC: 39.7 % (ref 37.9–51)
HGB BLD-MCNC: 13.1 G/DL (ref 13.5–17)
MCH RBC QN AUTO: 29.1 PG (ref 27–33.4)
MCHC RBC AUTO-ENTMCNC: 33.1 G/DL (ref 32–36)
MCV RBC AUTO: 88 FL (ref 80–97)
PLATELET # BLD: 416 10^3/UL (ref 150–450)
RBC # BLD AUTO: 4.51 10^6/UL (ref 4.35–5.55)
WBC # BLD AUTO: 6.4 10^3/UL (ref 4–10.5)

## 2018-07-24 PROCEDURE — 85027 COMPLETE CBC AUTOMATED: CPT

## 2018-07-24 PROCEDURE — 43239 EGD BIOPSY SINGLE/MULTIPLE: CPT

## 2018-07-24 PROCEDURE — 88312 SPECIAL STAINS GROUP 1: CPT

## 2018-07-24 PROCEDURE — 88342 IMHCHEM/IMCYTCHM 1ST ANTB: CPT

## 2018-07-24 PROCEDURE — 36415 COLL VENOUS BLD VENIPUNCTURE: CPT

## 2018-07-24 PROCEDURE — 88305 TISSUE EXAM BY PATHOLOGIST: CPT

## 2018-07-24 NOTE — DISCHARGE SUMMARY
Discharge Summary (SDC)





- Discharge


Final Diagnosis: 





Severe gastritis, gastric ulcer, duodenitis, small/sliding hiatal hernia, 

reflux esophagitis


Date of Surgery: 07/24/18


Discharge Date: 07/24/18


Condition: Stable


Forms:  [facility] Anesthesia D/C Instruction, Discharge POC-Surgical Service


Referrals: 


CHRYSTAL MELGOZA MD [ACTIVE STAFF] - 08/01/18 8:00 am


Discharge Diet: As Tolerated


Respiratory Treatments at Home: Deep Breathing/Coughing, Incentive Spirometer


Discharge Activity: Activity As Tolerated


Home Care Assistance: None Needed


Report the Following to Your Physician Immediately: Shortness of Breath, 

Increase in Pain, Fever over 101 Degrees, Unusual Bleeding, Swelling, IV Site 

Infection Signs

## 2018-07-24 NOTE — OPERATIVE REPORT
Nonrecallable Operative Report


DATE OF SURGERY: 07/24/18


PREOPERATIVE DIAGNOSIS: 1.  Abdominal pain.  2.  History of perforated gastric 

ulcer


POSTOPERATIVE DIAGNOSIS: 1.  Prepyloric gastric ulcer (shrinking).  2.  Severe 

gastritis.  3.  Duodenitis.  4.  Sliding hiatal hernia.  5.  Reflux esophagitis


OPERATION: EGD with biopsy


SURGEON: CHRYSTAL MELGOZA


ANESTHESIA: LMAC


TISSUE REMOVED OR ALTERED: 1.  Antrum.  2.  Duodenum.  3.  Margin of ulcer.  4.

  Center of ulcer.  5.  Gastric body.  6.  Distal esophagus.  7.  Esophageal 

ulcer at 25 cm


COMPLICATIONS: 





None apparent


ESTIMATED BLOOD LOSS: Minimal


PROCEDURE: 





Drains/implants: None.





Procedure in detail: After informed consent was obtained, the patient was laid 

into the left lateral decubitus position.  The endoscope was passed down the 

oropharynx, down the esophagus, and into the stomach.  The stomach was 

insufflated with air.  Immediately, there was noted to be severe gastritis 

throughout the stomach.  The scope was pushed through the stomach to the 

antrum.  The prepyloric ulcer was easily identified.  It was smaller than on 

previous exam, but was still present.  The scope was pushed through the pylorus

, and into the duodenum.  The first and second portions of the duodenum were 

examined.  The second portion was normal, however the first portion had 

evidence of duodenitis.  Biopsy was taken in the duodenal bulb.  The scope was 

pulled into the antrum.  Antral biopsy was taken.  Biopsy was also taken at the 

ulcer margin and central portion.  The scope was withdrawn into the gastric 

body.  A biopsy was taken in the gastric body, at the area of maximal 

gastritis.  A retroflexion maneuver was performed, noting a sliding-type hiatal 

hernia.  The scope was withdrawn into the distal esophagus.  There is evidence 

of reflux esophagitis.  Biopsy was taken in the distal esophagus.  The scope 

was then withdrawn up the remainder of the esophagus.  At approximately 25 cm 

there was found to be another esophageal ulceration.  Biopsy was taken at the 

ulceration.  The scope was then removed from the patient, and the procedure was 

concluded.  All sponge, instrument, and needle counts were correct 2.





Addition: Stable.

## 2019-09-15 ENCOUNTER — HOSPITAL ENCOUNTER (INPATIENT)
Dept: HOSPITAL 62 - ER | Age: 49
LOS: 3 days | Discharge: HOME | DRG: 189 | End: 2019-09-18
Attending: STUDENT IN AN ORGANIZED HEALTH CARE EDUCATION/TRAINING PROGRAM | Admitting: STUDENT IN AN ORGANIZED HEALTH CARE EDUCATION/TRAINING PROGRAM
Payer: SELF-PAY

## 2019-09-15 DIAGNOSIS — D49.7: ICD-10-CM

## 2019-09-15 DIAGNOSIS — E03.9: ICD-10-CM

## 2019-09-15 DIAGNOSIS — J44.0: ICD-10-CM

## 2019-09-15 DIAGNOSIS — J96.22: Primary | ICD-10-CM

## 2019-09-15 DIAGNOSIS — Z99.81: ICD-10-CM

## 2019-09-15 DIAGNOSIS — G47.33: ICD-10-CM

## 2019-09-15 DIAGNOSIS — J44.1: ICD-10-CM

## 2019-09-15 DIAGNOSIS — E66.9: ICD-10-CM

## 2019-09-15 DIAGNOSIS — Z91.19: ICD-10-CM

## 2019-09-15 DIAGNOSIS — J18.1: ICD-10-CM

## 2019-09-15 DIAGNOSIS — Z79.899: ICD-10-CM

## 2019-09-15 DIAGNOSIS — F17.210: ICD-10-CM

## 2019-09-15 LAB
ADD MANUAL DIFF: NO
ALBUMIN SERPL-MCNC: 4.1 G/DL (ref 3.5–5)
ALP SERPL-CCNC: 61 U/L (ref 38–126)
ANION GAP SERPL CALC-SCNC: 6 MMOL/L (ref 5–19)
APPEARANCE UR: CLEAR
APTT PPP: YELLOW S
ARTERIAL BLOOD FIO2: (no result)
ARTERIAL BLOOD FIO2: (no result)
ARTERIAL BLOOD H2CO3: 2.1 MMOL/L (ref 1.05–1.35)
ARTERIAL BLOOD H2CO3: 3.14 MMOL/L (ref 1.05–1.35)
ARTERIAL BLOOD HCO3: 32.8 MMOL/L (ref 20–24)
ARTERIAL BLOOD HCO3: 35 MMOL/L (ref 20–24)
ARTERIAL BLOOD PCO2: 104.4 MMHG (ref 35–45)
ARTERIAL BLOOD PCO2: 69.9 MMHG (ref 35–45)
ARTERIAL BLOOD PH: 7.14 (ref 7.35–7.45)
ARTERIAL BLOOD PH: 7.29 (ref 7.35–7.45)
ARTERIAL BLOOD PO2: 73 MMHG (ref 80–100)
ARTERIAL BLOOD PO2: 76.3 MMHG (ref 80–100)
ARTERIAL BLOOD TOTAL CO2: 34.9 MMOL/L (ref 23–27)
ARTERIAL BLOOD TOTAL CO2: 38.2 MMOL/L (ref 23–27)
AST SERPL-CCNC: 30 U/L (ref 17–59)
BASE EXCESS BLDA CALC-SCNC: 1.7 MMOL/L
BASE EXCESS BLDA CALC-SCNC: 3.4 MMOL/L
BASE EXCESS BLDV CALC-SCNC: -0.7 MMOL/L
BASOPHILS # BLD AUTO: 0 10^3/UL (ref 0–0.2)
BASOPHILS NFR BLD AUTO: 0.4 % (ref 0–2)
BILIRUB DIRECT SERPL-MCNC: 0.2 MG/DL (ref 0–0.4)
BILIRUB SERPL-MCNC: 0.3 MG/DL (ref 0.2–1.3)
BILIRUB UR QL STRIP: NEGATIVE
BUN SERPL-MCNC: 21 MG/DL (ref 7–20)
CALCIUM: 9.4 MG/DL (ref 8.4–10.2)
CHLORIDE SERPL-SCNC: 102 MMOL/L (ref 98–107)
CK MB SERPL-MCNC: 1.8 NG/ML (ref ?–4.55)
CK SERPL-CCNC: 74 U/L (ref 55–170)
CO2 SERPL-SCNC: 34 MMOL/L (ref 22–30)
EOSINOPHIL # BLD AUTO: 0.1 10^3/UL (ref 0–0.6)
EOSINOPHIL NFR BLD AUTO: 1 % (ref 0–6)
ERYTHROCYTE [DISTWIDTH] IN BLOOD BY AUTOMATED COUNT: 16 % (ref 11.5–14)
FREE T4 (FREE THYROXINE): 0.63 NG/DL (ref 0.78–2.19)
GLUCOSE SERPL-MCNC: 110 MG/DL (ref 75–110)
GLUCOSE UR STRIP-MCNC: NEGATIVE MG/DL
HCO3 BLDV-SCNC: 31.6 MMOL/L (ref 20–32)
HCT VFR BLD CALC: 49.5 % (ref 37.9–51)
HGB BLD-MCNC: 16 G/DL (ref 13.5–17)
KETONES UR STRIP-MCNC: NEGATIVE MG/DL
LYMPHOCYTES # BLD AUTO: 0.9 10^3/UL (ref 0.5–4.7)
LYMPHOCYTES NFR BLD AUTO: 7.7 % (ref 13–45)
MCH RBC QN AUTO: 32.9 PG (ref 27–33.4)
MCHC RBC AUTO-ENTMCNC: 32.3 G/DL (ref 32–36)
MCV RBC AUTO: 102 FL (ref 80–97)
MONOCYTES # BLD AUTO: 1.1 10^3/UL (ref 0.1–1.4)
MONOCYTES NFR BLD AUTO: 9.2 % (ref 3–13)
NEUTROPHILS # BLD AUTO: 9.4 10^3/UL (ref 1.7–8.2)
NEUTS SEG NFR BLD AUTO: 81.7 % (ref 42–78)
NITRITE UR QL STRIP: NEGATIVE
PCO2 BLDV: 92.7 MMHG (ref 35–63)
PH BLDV: 7.15 [PH] (ref 7.3–7.42)
PH UR STRIP: 6 [PH] (ref 5–9)
PLATELET # BLD: 300 10^3/UL (ref 150–450)
POTASSIUM SERPL-SCNC: 4.4 MMOL/L (ref 3.6–5)
PROT SERPL-MCNC: 6.8 G/DL (ref 6.3–8.2)
PROT UR STRIP-MCNC: >=500 MG/DL
RBC # BLD AUTO: 4.86 10^6/UL (ref 4.35–5.55)
SAO2 % BLDA: 88.5 % (ref 94–98)
SAO2 % BLDA: 93.3 % (ref 94–98)
SP GR UR STRIP: 1.03
T3FREE SERPL-MCNC: 2.51 PG/ML (ref 2.77–5.27)
TOTAL CELLS COUNTED % (AUTO): 100 %
TROPONIN I SERPL-MCNC: 0.03 NG/ML
UROBILINOGEN UR-MCNC: 2 MG/DL (ref ?–2)
WBC # BLD AUTO: 11.5 10^3/UL (ref 4–10.5)

## 2019-09-15 PROCEDURE — 94660 CPAP INITIATION&MGMT: CPT

## 2019-09-15 PROCEDURE — 36600 WITHDRAWAL OF ARTERIAL BLOOD: CPT

## 2019-09-15 PROCEDURE — S0028 INJECTION, FAMOTIDINE, 20 MG: HCPCS

## 2019-09-15 PROCEDURE — 71045 X-RAY EXAM CHEST 1 VIEW: CPT

## 2019-09-15 PROCEDURE — 93005 ELECTROCARDIOGRAM TRACING: CPT

## 2019-09-15 PROCEDURE — 81001 URINALYSIS AUTO W/SCOPE: CPT

## 2019-09-15 PROCEDURE — 93010 ELECTROCARDIOGRAM REPORT: CPT

## 2019-09-15 PROCEDURE — 36415 COLL VENOUS BLD VENIPUNCTURE: CPT

## 2019-09-15 PROCEDURE — 83605 ASSAY OF LACTIC ACID: CPT

## 2019-09-15 PROCEDURE — 84443 ASSAY THYROID STIM HORMONE: CPT

## 2019-09-15 PROCEDURE — 99291 CRITICAL CARE FIRST HOUR: CPT

## 2019-09-15 PROCEDURE — 71046 X-RAY EXAM CHEST 2 VIEWS: CPT

## 2019-09-15 PROCEDURE — 84439 ASSAY OF FREE THYROXINE: CPT

## 2019-09-15 PROCEDURE — 80048 BASIC METABOLIC PNL TOTAL CA: CPT

## 2019-09-15 PROCEDURE — 85027 COMPLETE CBC AUTOMATED: CPT

## 2019-09-15 PROCEDURE — 96368 THER/DIAG CONCURRENT INF: CPT

## 2019-09-15 PROCEDURE — 85025 COMPLETE CBC W/AUTO DIFF WBC: CPT

## 2019-09-15 PROCEDURE — 80053 COMPREHEN METABOLIC PANEL: CPT

## 2019-09-15 PROCEDURE — 70450 CT HEAD/BRAIN W/O DYE: CPT

## 2019-09-15 PROCEDURE — 87040 BLOOD CULTURE FOR BACTERIA: CPT

## 2019-09-15 PROCEDURE — 82553 CREATINE MB FRACTION: CPT

## 2019-09-15 PROCEDURE — 94640 AIRWAY INHALATION TREATMENT: CPT

## 2019-09-15 PROCEDURE — 82550 ASSAY OF CK (CPK): CPT

## 2019-09-15 PROCEDURE — 93306 TTE W/DOPPLER COMPLETE: CPT

## 2019-09-15 PROCEDURE — 96375 TX/PRO/DX INJ NEW DRUG ADDON: CPT

## 2019-09-15 PROCEDURE — 96365 THER/PROPH/DIAG IV INF INIT: CPT

## 2019-09-15 PROCEDURE — 84484 ASSAY OF TROPONIN QUANT: CPT

## 2019-09-15 PROCEDURE — 84481 FREE ASSAY (FT-3): CPT

## 2019-09-15 PROCEDURE — 5A09457 ASSISTANCE WITH RESPIRATORY VENTILATION, 24-96 CONSECUTIVE HOURS, CONTINUOUS POSITIVE AIRWAY PRESSURE: ICD-10-PCS | Performed by: INTERNAL MEDICINE

## 2019-09-15 PROCEDURE — 82803 BLOOD GASES ANY COMBINATION: CPT

## 2019-09-15 PROCEDURE — 82962 GLUCOSE BLOOD TEST: CPT

## 2019-09-15 RX ADMIN — HEPARIN SODIUM SCH UNIT: 5000 INJECTION, SOLUTION INTRAVENOUS; SUBCUTANEOUS at 22:01

## 2019-09-15 RX ADMIN — IPRATROPIUM BROMIDE AND ALBUTEROL SULFATE SCH ML: 2.5; .5 SOLUTION RESPIRATORY (INHALATION) at 16:55

## 2019-09-15 RX ADMIN — KETOROLAC TROMETHAMINE PRN MG: 30 INJECTION, SOLUTION INTRAMUSCULAR at 20:28

## 2019-09-15 RX ADMIN — IPRATROPIUM BROMIDE AND ALBUTEROL SULFATE SCH ML: 2.5; .5 SOLUTION RESPIRATORY (INHALATION) at 20:54

## 2019-09-15 RX ADMIN — HYDRALAZINE HYDROCHLORIDE PRN MG: 20 INJECTION INTRAMUSCULAR; INTRAVENOUS at 16:55

## 2019-09-15 RX ADMIN — HYDRALAZINE HYDROCHLORIDE PRN MG: 20 INJECTION INTRAMUSCULAR; INTRAVENOUS at 21:00

## 2019-09-15 RX ADMIN — METHYLPREDNISOLONE SODIUM SUCCINATE SCH MG: 40 INJECTION, POWDER, FOR SOLUTION INTRAMUSCULAR; INTRAVENOUS at 22:01

## 2019-09-15 NOTE — ADVANCED CARE
- Diagnosis


(1) Hypothyroidism


Diagnosis Current: Yes   





(2) Acute on chronic respiratory failure with hypoxia and hypercapnia


Diagnosis Current: Yes   





(3) COPD exacerbation


Diagnosis Current: Yes   





(4) CORAL (obstructive sleep apnea)


Diagnosis Current: Yes   





(5) Pneumonia


Diagnosis Current: Yes   





(6) Pituitary tumor


Diagnosis Current: Yes   


Resuscitation Status: Full Code


Discussion: 


Discussed with patient and wife at bedside.  He expresses he is a full code and 

prefers to receive chest compressions, defibrillation or mechanical ventilation 

if the need arises.  He says he was intubated before and was on the ventilator 

for 22 days.  He says that his wife, Sav Peng is his surrogate medical 

decision maker.

## 2019-09-15 NOTE — RADIOLOGY REPORT (SQ)
EXAM DESCRIPTION:  CHEST SINGLE VIEW



COMPLETED DATE/TIME:  9/15/2019 9:16 am



REASON FOR STUDY:  cough



COMPARISON:  2017.



NUMBER OF VIEWS:  One view.



TECHNIQUE:  Single frontal radiographic view of the chest acquired.



LIMITATIONS:  None.



FINDINGS:  LUNGS AND PLEURA: Small left effusion.  Patchy left upper lobe and left lower lobe airspac
e disease and volume loss.  No pneumothorax.  Right lung clear.

MEDIASTINUM AND HILAR STRUCTURES: No masses.  Contour normal.

HEART AND VASCULAR STRUCTURES: Cardiomegaly.

BONES: No acute findings.

HARDWARE: None in the chest.

OTHER: No other significant finding.



IMPRESSION:  Left pneumonia and parapneumonic effusion.



TECHNICAL DOCUMENTATION:  JOB ID:  [id number]

 2011 [employer]- All Rights Reserved



Reading location - IP/workstation name: [email or web address]

## 2019-09-15 NOTE — EKG REPORT
SEVERITY:- ABNORMAL ECG -

SINUS TACHYCARDIA

ATRIAL PREMATURE COMPLEX

PROBABLE LEFT ATRIAL ABNORMALITY

RIGHT BUNDLE BRANCH BLOCK

ST DEPRESSION, CONSIDER ISCHEMIA, ANT-LAT LDS

:

Confirmed by: Marcia Phillips MD 15-Sep-2019 10:21:03

## 2019-09-15 NOTE — ER DOCUMENT REPORT
ED General





- General


Chief Complaint: Chest Pain


Stated Complaint: DIFFICULTY BREATHING


Time Seen by Provider: 09/15/19 08:38


Mode of Arrival: Ambulatory


Information source: Patient


TRAVEL OUTSIDE OF THE [place] IN LAST 30 DAYS: No





- HPI


Patient complains to provider of: Dyspnea


Onset: Last week


Onset/Duration: Gradual


Quality of pain: No pain


Severity: Moderate


Context: 





48 year old male with h/o CORAL, COPD, current everyday smoker, thyroid disease 

presents with wife with complaints of increasing shortness of breath and 

respiratory illness for about 1 week per wife.  His wife mentions he was 

confused at home today trying to wear his own CPAP but exhibiting some degree of

confusion with this.   Also notes some lower extremity edema noted over the last

several days. 


Associated symptoms: None


Exacerbated by: Denies


Similar symptoms previously: Yes





- Related Data


Allergies/Adverse Reactions: 


                                        





No Known Allergies Allergy (Verified 09/15/19 08:26)


   











Past Medical History





- General


Information source: Patient





- Social History


Smoking Status: Current Every Day Smoker


Frequency of alcohol use: None


Drug Abuse: None


Lives with: Family


Family History: Hypertension


Patient has suicidal ideation: No


Patient has homicidal ideation: No





- Past Medical History


Cardiac Medical History: Reports: Hx Hypertension


   Denies: Hx Coronary Artery Disease, Hx Heart Attack


Pulmonary Medical History: Reports: Hx COPD, Hx Pneumonia


   Denies: Hx Asthma, Hx Bronchitis


Neurological Medical History: Denies: Hx Cerebrovascular Accident, Hx Seizures


Endocrine Medical History: Reports: Hx Hypothyroidism


Renal/ Medical History: Denies: Hx Peritoneal Dialysis


Musculoskeletal Medical History: Denies Hx Arthritis


Psychiatric Medical History: 


   Denies: Hx Depression


Past Surgical History: Reports: Hx Abdominal Surgery - D/t ruptured ulcer, Hx Or

thopedic Surgery - orbital fracture, Other - Repair of perforated gastric ulcer





- Immunizations


Hx Diphtheria, Pertussis, Tetanus Vaccination: No





Review of Systems





- Review of Systems


Constitutional: No symptoms reported


EENT: No symptoms reported


Cardiovascular: No symptoms reported


Respiratory: Cough, Short of breath.  denies: Sputum


Gastrointestinal: No symptoms reported


Genitourinary: No symptoms reported


Male Genitourinary: No symptoms reported


Musculoskeletal: No symptoms reported


Skin: No symptoms reported


Hematologic/Lymphatic: No symptoms reported


Neurological/Psychological: No symptoms reported





Physical Exam





- Vital signs


Vitals: 


                                        











Pulse Ox


 


 60 L


 


 09/15/19 08:36











Interpretation: Normal





- General


General appearance: Appears well, Alert





- HEENT


Head: Normocephalic, Atraumatic


Eyes: Normal


Pupils: PERRL





- Respiratory


Respiratory status: Respiratory distress


Chest status: Nontender


Breath sounds: Decreased air movement, Rhonchi


Chest palpation: Normal





- Cardiovascular


Rhythm: Regular


Heart sounds: Normal auscultation


Murmur: No





- Abdominal


Inspection: Normal


Distension: No distension


Bowel sounds: Normal


Tenderness: Nontender


Organomegaly: No organomegaly





- Back


Back: Nontender





- Extremities


General upper extremity: Normal inspection, Nontender, Normal color, Normal ROM,

Normal temperature


General lower extremity: Normal inspection, Nontender, Normal color, Normal ROM,

Normal temperature.  No: Dena's sign


Foot: Edema





- Neurological


Neuro grossly intact: Yes


Cognition: Normal


Orientation: AAOx4


Rene Coma Scale Eye Opening: Spontaneous


Rene Coma Scale Verbal: Oriented


Ava Coma Scale Motor: Obeys Commands


Rene Coma Scale Total: 15


Speech: Normal


Motor strength normal: LUE, RUE, LLE, RLE


Sensory: Normal





- Psychological


Associated symptoms: Normal affect, Normal mood





- Skin


Skin Temperature: Warm


Skin Moisture: Dry


Skin Color: Normal





Course





- Re-evaluation


Re-evalutation: 





09/15/19 14:34


MDM  I have discussed with Dr. Rivera and he has graciously agreed to see and 

evaluate for admission. 





- Vital Signs


Vital signs: 


                                        











Temp Pulse Resp BP Pulse Ox


 


 98.3 F      24 H  163/101 H  94 


 


 09/15/19 15:03     09/15/19 15:01  09/15/19 14:01  09/15/19 15:01














- Laboratory


Result Diagrams: 


                                 09/15/19 09:04





                                 09/15/19 09:57


Laboratory results interpreted by me: 


                                        











  09/15/19 09/15/19 09/15/19





  09:04 09:57 10:10


 


WBC  11.5 H  


 


MCV  102 H  


 


RDW  16.0 H  


 


Lymph % (Auto)  7.7 L  


 


Absolute Neuts (auto)  9.4 H  


 


Seg Neutrophils %  81.7 H  


 


Carbonic Acid    3.14 H


 


ABG pH    7.14 L*


 


ABG pCO2    104.4 H*


 


ABG pO2    73.0 L


 


ABG HCO3    35.0 H


 


ABG Total CO2    38.2 H


 


ABG O2 Saturation    88.5 L


 


VBG pH   


 


VBG pCO2   


 


Carbon Dioxide   34 H 


 


BUN   21 H 














  09/15/19





  13:52


 


WBC 


 


MCV 


 


RDW 


 


Lymph % (Auto) 


 


Absolute Neuts (auto) 


 


Seg Neutrophils % 


 


Carbonic Acid 


 


ABG pH 


 


ABG pCO2 


 


ABG pO2 


 


ABG HCO3 


 


ABG Total CO2 


 


ABG O2 Saturation 


 


VBG pH  7.15 L*


 


VBG pCO2  92.7 H*


 


Carbon Dioxide 


 


BUN 














- Diagnostic Test


Radiology reviewed: Image reviewed, Reports reviewed





Critical Care Note





- Critical Care Note


Total time excluding time spent on procedures (mins): 30





Discharge





- Discharge


Clinical Impression: 


 CORAL (obstructive sleep apnea), Acute and chronic respiratory failure with 

hypercapnia





Pneumonia


Qualifiers:


 Pneumonia type: due to unspecified organism Laterality: left Lung location: 

upper lobe of lung Qualified Code(s): J18.1 - Lobar pneumonia, unspecified 

organism





Respiratory failure with hypercapnia


Qualifiers:


 Chronicity: acute Qualified Code(s): J96.02 - Acute respiratory failure with 

hypercapnia





Condition: Fair


Disposition: ADMITTED AS INPATIENT

## 2019-09-15 NOTE — PDOC PROGRESS REPORT
Subjective


Progress Note for:: 09/15/19


Subjective:: 





Patient complains of: SOB, confusion


History of Present Illness: 


MARK PAYNE is a 48 year old male with a past medical history of COPD not

on home O2, CORAL on CPAP at home, hypothyroidism, history of GI bleed from a 

perforated gastric ulcer, history of pituitary tumor on cabergoline, chronic 

cigarette smoking, obesity and hypertension who was brought in due to increasing

shortness of breath and confusion.





Wife says that patient has been off his CPAP machine for the past 3 weeks due to

recently removed tooth abscess causing pain when he uses the CPAP at home.  She 

says that he has been complaining of slowly progressive shortness of breath for 

almost a week now.  She says that he has chronic cough but it has not been 

significantly more productive than his baseline cough.  He says that yesterday 

patient was noted to be weaker and lethargic.  This morning, he was more 

confused and did not know what day it was.  In the ER, patient was noted to have

severely elevated PCO2 of 104.  Chest x-ray shows left-sided pneumonia.  He was 

placed on BiPAP.





Upon encounter, patient appears lethargic but is arousable.  He is oriented to 

person and place and is able to tell me some of his history.  There was initial 

report that he complained of chest pain but when this was clarified with patient

and wife, patient says that he did not have chest pain but more of shortness of 

breath.  His wife does say that he has been having increasing pedal edema in the

past 2 weeks.





9/15


I was asked to evaluate the patient by Dr. Rivera.


The patient has been drowsy. His inital ABG showed a significant acute on 

chronic respiratory acidosis.


The patient was admitted and intubated fawn 3 weeks about 1 year ago.





Reason For Visit: 


ACUTE RESPIRATORY FAILURE








Physical Exam


Vital Signs: 


                                        











Temp Pulse Resp BP Pulse Ox


 


 98.3 F      19   122/69   100 


 


 09/15/19 15:03     09/15/19 18:00  09/15/19 17:16  09/15/19 18:00








                                 Intake & Output











 09/14/19 09/15/19 09/16/19





 06:59 06:59 06:59


 


Output Total   500


 


Balance   -500


 


Weight   117.9 kg











General appearance: PRESENT: cooperative, mild distress


Eye exam: PRESENT: conjunctiva pink


Ear exam: PRESENT: normal external ear exam


Mouth exam: PRESENT: moist


Neck exam: ABSENT: carotid bruit, JVD, lymphadenopathy, meningismus


Respiratory exam: PRESENT: prolonged expiratory phas, rhonchi, symmetrical.  

ABSENT: accessory muscle use


Cardiovascular exam: PRESENT: RRR, +S1, +S2


Pulses: PRESENT: normal dorsalis pedis pul


Vascular exam: PRESENT: normal capillary refill


GI/Abdominal exam: PRESENT: normal bowel sounds - The patient is obese with a 

large abdomen. There is a midline scar from his previous surgery.  ABSENT: as

cites, guarding


Rectal exam: ABSENT: deferred


Gentrourinary exam: ABSENT: lesions


Extremities exam: PRESENT: full ROM


Musculoskeletal exam: PRESENT: full ROM


Psychiatric exam: PRESENT: normal mood


Skin exam: PRESENT: normal color





Results


Laboratory Results: 


                                        





                                 09/15/19 09:04 





                                 09/15/19 09:57 





                                        











  09/15/19 09/15/19 09/15/19





  09:04 09:04 09:04


 


WBC  11.5 H  


 


RBC  4.86  


 


Hgb  16.0  


 


Hct  49.5  


 


MCV  102 H  


 


MCH  32.9  


 


MCHC  32.3  


 


RDW  16.0 H  


 


Plt Count  300  


 


Seg Neutrophils %  81.7 H  


 


Carbonic Acid   


 


HCO3/H2CO3 Ratio   


 


ABG pH   


 


ABG pCO2   


 


ABG pO2   


 


ABG HCO3   


 


ABG O2 Saturation   


 


ABG Base Excess   


 


VBG pH   


 


VBG pCO2   


 


VBG HCO3   


 


VBG Base Excess   


 


FiO2   


 


Sodium   Cancelled 


 


Potassium   Cancelled 


 


Chloride   Cancelled 


 


Carbon Dioxide   Cancelled 


 


Anion Gap   Cancelled 


 


BUN   Cancelled 


 


Creatinine   Cancelled 


 


Est GFR ( Amer)   Cancelled 


 


Est GFR (Non-Af Amer)   Cancelled 


 


Glucose   Cancelled 


 


Lactic Acid    0.8


 


Calcium   Cancelled 


 


Total Bilirubin   Cancelled 


 


AST   Cancelled 


 


Alkaline Phosphatase   Cancelled 


 


Total Protein   Cancelled 


 


Albumin   Cancelled 


 


TSH   


 


Free T4   


 


Free T3 pg/mL   


 


Urine Color   


 


Urine Appearance   


 


Urine pH   


 


Ur Specific Gravity   


 


Urine Protein   


 


Urine Glucose (UA)   


 


Urine Ketones   


 


Urine Blood   


 


Urine Nitrite   


 


Ur Leukocyte Esterase   


 


Urine WBC (Auto)   


 


Urine RBC (Auto)   














  09/15/19 09/15/19 09/15/19





  09:04 09:04 09:57


 


WBC   


 


RBC   


 


Hgb   


 


Hct   


 


MCV   


 


MCH   


 


MCHC   


 


RDW   


 


Plt Count   


 


Seg Neutrophils %   


 


Carbonic Acid   


 


HCO3/H2CO3 Ratio   


 


ABG pH   


 


ABG pCO2   


 


ABG pO2   


 


ABG HCO3   


 


ABG O2 Saturation   


 


ABG Base Excess   


 


VBG pH   


 


VBG pCO2   


 


VBG HCO3   


 


VBG Base Excess   


 


FiO2   


 


Sodium    141.5


 


Potassium    4.4


 


Chloride    102


 


Carbon Dioxide    34 H


 


Anion Gap    6


 


BUN    21 H


 


Creatinine    0.80


 


Est GFR ( Amer)    > 60


 


Est GFR (Non-Af Amer)   


 


Glucose    110


 


Lactic Acid   


 


Calcium    9.4


 


Total Bilirubin    0.3


 


AST    30


 


Alkaline Phosphatase    61


 


Total Protein    6.8


 


Albumin    4.1


 


TSH  11.10 H  


 


Free T4   0.63 L 


 


Free T3 pg/mL   2.51 L 


 


Urine Color   


 


Urine Appearance   


 


Urine pH   


 


Ur Specific Gravity   


 


Urine Protein   


 


Urine Glucose (UA)   


 


Urine Ketones   


 


Urine Blood   


 


Urine Nitrite   


 


Ur Leukocyte Esterase   


 


Urine WBC (Auto)   


 


Urine RBC (Auto)   














  09/15/19 09/15/19 09/15/19





  10:10 13:52 15:22


 


WBC   


 


RBC   


 


Hgb   


 


Hct   


 


MCV   


 


MCH   


 


MCHC   


 


RDW   


 


Plt Count   


 


Seg Neutrophils %   


 


Carbonic Acid  3.14 H  


 


HCO3/H2CO3 Ratio  11:1  


 


ABG pH  7.14 L*  


 


ABG pCO2  104.4 H*  


 


ABG pO2  73.0 L  


 


ABG HCO3  35.0 H  


 


ABG O2 Saturation  88.5 L  


 


ABG Base Excess  1.7  


 


VBG pH   7.15 L* 


 


VBG pCO2   92.7 H* 


 


VBG HCO3   31.6 


 


VBG Base Excess   -0.7 


 


FiO2  100% NRB  


 


Sodium   


 


Potassium   


 


Chloride   


 


Carbon Dioxide   


 


Anion Gap   


 


BUN   


 


Creatinine   


 


Est GFR ( Amer)   


 


Est GFR (Non-Af Amer)   


 


Glucose   


 


Lactic Acid   


 


Calcium   


 


Total Bilirubin   


 


AST   


 


Alkaline Phosphatase   


 


Total Protein   


 


Albumin   


 


TSH   


 


Free T4   


 


Free T3 pg/mL   


 


Urine Color    YELLOW


 


Urine Appearance    CLEAR


 


Urine pH    6.0


 


Ur Specific Gravity    1.033


 


Urine Protein    >=500 H


 


Urine Glucose (UA)    NEGATIVE


 


Urine Ketones    NEGATIVE


 


Urine Blood    NEGATIVE


 


Urine Nitrite    NEGATIVE


 


Ur Leukocyte Esterase    NEGATIVE


 


Urine WBC (Auto)    4


 


Urine RBC (Auto)    2








                                        











  09/15/19 09/15/19 09/15/19





  09:04 09:04 09:57


 


Creatine Kinase  Cancelled   74


 


CK-MB (CK-2)   


 


Troponin I   Cancelled 


 


NT-Pro-B Natriuret Pep   Cancelled 














  09/15/19





  09:57


 


Creatine Kinase 


 


CK-MB (CK-2)  1.80


 


Troponin I  0.031


 


NT-Pro-B Natriuret Pep 











Impressions: 


                                        





Chest X-Ray  09/15/19 08:49


IMPRESSION:  Left pneumonia and parapneumonic effusion.


 














Assessment & Plan





- Diagnosis


(1) Acute and chronic respiratory failure with hypercapnia


Is this a current diagnosis for this admission?: Yes   


Plan: 


The patient has an ABG consistent with acute on chronic respiratory failure








(2) COPD exacerbation


Is this a current diagnosis for this admission?: Yes   


Plan: 


The patient has a long history of chronic COPD. He is on home 02 and uses a CPAP

at [facility].


He was not able toiuse his CPAP the opast few weeks which likely contributed to 

this worsening respiratory acidosis.








(3) Pneumonia


Qualifiers: 


   Pneumonia type: due to unspecified organism   Laterality: left   Lung loc

ation: lower lobe of lung   Qualified Code(s): J18.1 - Lobar pneumonia, 

unspecified organism   


Is this a current diagnosis for this admission?: Yes   


Plan: 


The patient has a large LLL +/- lingular pneumonia.


Will request sputm culture. The patient was started empirically on Levaquin








- Time


Time Spent with patient: 35 or more minutes


Medications reviewed and adjusted accordingly: Yes


Anticipated discharge: Home


Within: within 72 hours

## 2019-09-15 NOTE — PDOC H&P
History of Present Illness


Admission Date/PCP: 


  





  CANDICE JAMES PA-C





Patient complains of: SOB, confusion


History of Present Illness: 


MARK PAYNE is a 48 year old male with a past medical history of COPD not

on home O2, CORAL on CPAP at home, hypothyroidism, history of GI bleed from a 

perforated gastric ulcer, history of pituitary tumor on cabergoline, chronic 

cigarette smoking, obesity and hypertension who was brought in due to increasing

shortness of breath and confusion.





Wife says that patient has been off his CPAP machine for the past 3 weeks due to

recently removed tooth abscess causing pain when he uses the CPAP at home.  She 

says that he has been complaining of slowly progressive shortness of breath for 

almost a week now.  She says that he has chronic cough but it has not been 

significantly more productive than his baseline cough.  He says that yesterday 

patient was noted to be weaker and lethargic.  This morning, he was more 

confused and did not know what day it was.  In the ER, patient was noted to have

severely elevated PCO2 of 104.  Chest x-ray shows left-sided pneumonia.  He was 

placed on BiPAP.





Upon encounter, patient appears lethargic but is arousable.  He is oriented to 

person and place and is able to tell me some of his history.  There was initial 

report that he complained of chest pain but when this was clarified with patient

and wife, patient says that he did not have chest pain but more of shortness of 

breath.  His wife does say that he has been having increasing pedal edema in the

past 2 weeks.











Past Medical History


Cardiac Medical History: Reports: Hypertension


   Denies: Coronary Artery Disease, Myocardial Infarction


Pulmonary Medical History: Reports: Chronic Obstructive Pulmonary Disease 

(COPD), Pneumonia


   Denies: Asthma, Bronchitis


Neurological Medical History: 


   Denies: Seizures


Endocrine Medical History: Reports: Hypothyroidism


Musculoskeltal Medical History: 


   Denies: Arthritis


Psychiatric Medical History: 


   Denies: Depression


Hematology: 


   Denies: Anemia





Past Surgical History


Past Surgical History: Reports: Orthopedic Surgery - orbital fracture, Other - 

Repair of perforated gastric ulcer





Social History


Lives with: Family


Smoking Status: Current Every Day Smoker


Frequency of Alcohol Use: None


Hx Recreational Drug Use: No


Drugs: None


Hx Prescription Drug Abuse: No





Family History


Family History: Hypertension


Parental Family History Reviewed: Yes - No premature CAD


Children Family History Reviewed: No


Sibling(s) Family History Reviewed.: No





Medication/Allergy


Home Medications: 








Cabergoline [Cabergoline 0.5 mg Tablet] 0.25 mg PO TUFR@1000 06/03/18 


Levothyroxine Sodium [Synthroid 0.112 mg Tablet] 0.224 mg PO Q6AM 06/03/18 


Albuterol Sulfate [Albuterol Sulfate Hfa] 2 puff IH Q6HP PRN 09/15/19 


Ipratropium/Albuterol Sulfate [Duoneb 3 ml Ampul] 3 ml NEB RTQ4HP PRN 09/15/19 








Allergies/Adverse Reactions: 


                                        





No Known Allergies Allergy (Verified 09/15/19 08:26)


   











Review of Systems


All systems: reviewed and no additional remarkable complaints except as stated -

As mentioned in HPI





Physical Exam


Vital Signs: 


                                        











Temp Pulse Resp BP Pulse Ox


 


 98.3 F      24 H  163/101 H  94 


 


 09/15/19 15:03     09/15/19 15:01  09/15/19 14:01  09/15/19 15:01








                                 Intake & Output











 09/14/19 09/15/19 09/16/19





 06:59 06:59 06:59


 


Weight   259 lb 14.8 oz











General appearance: PRESENT: no acute distress, obese


Head exam: PRESENT: atraumatic, normocephalic


Eye exam: PRESENT: conjunctiva pink, EOMI, PERRLA.  ABSENT: scleral icterus


Ear exam: PRESENT: normal external ear exam


Mouth exam: PRESENT: moist, tongue midline


Neck exam: ABSENT: carotid bruit, JVD, lymphadenopathy, thyromegaly


Respiratory exam: PRESENT: rales, rhonchi.  ABSENT: wheezes


Cardiovascular exam: PRESENT: RRR.  ABSENT: diastolic murmur, rubs, systolic 

murmur


Pulses: PRESENT: normal dorsalis pedis pul


GI/Abdominal exam: PRESENT: normal bowel sounds, soft.  ABSENT: distended, 

guarding, mass, organolmegaly, rebound, tenderness


Rectal exam: PRESENT: deferred


Neurological exam: PRESENT: alert, awake, oriented to person, oriented to place,

CN II-XII grossly intact.  ABSENT: motor sensory deficit





Results


Laboratory Results: 


                                        





                                 09/15/19 09:04 





                                 09/15/19 09:57 





                                        











  09/15/19 09/15/19 09/15/19





  09:04 09:04 09:04


 


WBC  11.5 H  


 


RBC  4.86  


 


Hgb  16.0  


 


Hct  49.5  


 


MCV  102 H  


 


MCH  32.9  


 


MCHC  32.3  


 


RDW  16.0 H  


 


Plt Count  300  


 


Seg Neutrophils %  81.7 H  


 


Carbonic Acid   


 


HCO3/H2CO3 Ratio   


 


ABG pH   


 


ABG pCO2   


 


ABG pO2   


 


ABG HCO3   


 


ABG O2 Saturation   


 


ABG Base Excess   


 


VBG pH   


 


VBG pCO2   


 


VBG HCO3   


 


VBG Base Excess   


 


FiO2   


 


Sodium   Cancelled 


 


Potassium   Cancelled 


 


Chloride   Cancelled 


 


Carbon Dioxide   Cancelled 


 


Anion Gap   Cancelled 


 


BUN   Cancelled 


 


Creatinine   Cancelled 


 


Est GFR ( Amer)   Cancelled 


 


Est GFR (Non-Af Amer)   Cancelled 


 


Glucose   Cancelled 


 


Lactic Acid    0.8


 


Calcium   Cancelled 


 


Total Bilirubin   Cancelled 


 


AST   Cancelled 


 


Alkaline Phosphatase   Cancelled 


 


Total Protein   Cancelled 


 


Albumin   Cancelled 














  09/15/19 09/15/19 09/15/19





  09:57 10:10 13:52


 


WBC   


 


RBC   


 


Hgb   


 


Hct   


 


MCV   


 


MCH   


 


MCHC   


 


RDW   


 


Plt Count   


 


Seg Neutrophils %   


 


Carbonic Acid   3.14 H 


 


HCO3/H2CO3 Ratio   11:1 


 


ABG pH   7.14 L* 


 


ABG pCO2   104.4 H* 


 


ABG pO2   73.0 L 


 


ABG HCO3   35.0 H 


 


ABG O2 Saturation   88.5 L 


 


ABG Base Excess   1.7 


 


VBG pH    7.15 L*


 


VBG pCO2    92.7 H*


 


VBG HCO3    31.6


 


VBG Base Excess    -0.7


 


FiO2   100% NRB 


 


Sodium  141.5  


 


Potassium  4.4  


 


Chloride  102  


 


Carbon Dioxide  34 H  


 


Anion Gap  6  


 


BUN  21 H  


 


Creatinine  0.80  


 


Est GFR ( Amer)  > 60  


 


Est GFR (Non-Af Amer)   


 


Glucose  110  


 


Lactic Acid   


 


Calcium  9.4  


 


Total Bilirubin  0.3  


 


AST  30  


 


Alkaline Phosphatase  61  


 


Total Protein  6.8  


 


Albumin  4.1  








                                        











  09/15/19 09/15/19 09/15/19





  09:04 09:04 09:57


 


Creatine Kinase  Cancelled   74


 


CK-MB (CK-2)   


 


Troponin I   Cancelled 


 


NT-Pro-B Natriuret Pep   Cancelled 














  09/15/19





  09:57


 


Creatine Kinase 


 


CK-MB (CK-2)  1.80


 


Troponin I  0.031


 


NT-Pro-B Natriuret Pep 











Impressions: 


                                        





Chest X-Ray  09/15/19 08:49


IMPRESSION:  Left pneumonia and parapneumonic effusion.


 














Assessment and Plan





- Diagnosis


(1) Acute on chronic respiratory failure with hypoxia and hypercapnia


Is this a current diagnosis for this admission?: Yes   


Plan: 


Multifactorial from left-sided pneumonia and COPD exacerbation in a patient with

long-standing COPD and CORAL and noncompliance to CPAP at home.





Saturating at 94% on BiPAP.  Will increase BiPAP settings.  Repeat ABG.  Close 

monitoring of neurologic symptoms as patient may be a potential candidate for 

intubation if his mentation deteriorates along with persistently elevated PCO2.








(2) COPD exacerbation


Is this a current diagnosis for this admission?: Yes   


Plan: 


Start IV steroids and scheduled breathing treatments.








(3) Pneumonia


Qualifiers: 


   Pneumonia type: due to unspecified organism   Laterality: left   Lung 

location: upper lobe of lung   Qualified Code(s): J18.1 - Lobar pneumonia, 

unspecified organism   


Is this a current diagnosis for this admission?: Yes   


Plan: 


We will start levofloxacin.  Will order for sputum culture.








(4) Hypothyroidism


Is this a current diagnosis for this admission?: Yes   


Plan: 


We will recheck thyroid panel.








(5) Pituitary tumor


Is this a current diagnosis for this admission?: Yes   


Plan: 


On cabergoline at home.








- Time


Time Spent with patient: 25-34 minutes

## 2019-09-15 NOTE — RADIOLOGY REPORT (SQ)
EXAM DESCRIPTION:  CT HEAD WITHOUT



COMPLETED DATE/TIME:  9/15/2019 7:00 pm



REASON FOR STUDY:  AMS, has a pituitary tumor



COMPARISON:  None.



TECHNIQUE:  Axial images acquired through the brain without intravenous contrast. Images reviewed wit
h bone, brain and subdural windows.  Images stored on PACS.

All CT scanners at this facility use dose modulation, iterative reconstruction, and/or weight based d
osing when appropriate to reduce radiation dose to as low as reasonably achievable (ALARA).

[facility]: Dose Right  [facility]: CareDose    [facility]: Dose Right    [facility]: Teradose 4D    [facility]: Smart 5skills



RADIATION DOSE:  CT Rad equipment meets quality standard of care and radiation dose reduction techniq
ues were employed. CTDIvol: 53.2 mGy. DLP: 1097 mGy-cm..



LIMITATIONS:  None.



FINDINGS:  VENTRICLES: Normal size and contour.

CEREBRUM: No masses. No hemorrhage. No midline shift. Age appropriate white matter. No evidence for a
cute infarction.

CEREBELLUM: No masses. No hemorrhage. No alteration of density. No evidence for acute infarction.

EXTRA-AXIAL SPACES: No fluid collections.

ORBITS AND GLOBE: No intra- or extraconal masses. Normal contour of globe without masses.

CALVARIUM: No acute fracture.

PARANASAL SINUSES: No fluid or mucosal thickening.

SOFT TISSUES: No mass or hematoma.

OTHER: No other significant finding.



IMPRESSION:  NO ACUTE INTRACRANIAL FINDINGS.

EVIDENCE OF ACUTE STROKE: NO.



TECHNICAL DOCUMENTATION:  JOB ID:  [id number]

[id number]

Quality ID # [id number]: Final reports with documentation of one or more dose reduction techniques (e.g., Au
tomated exposure control, adjustment of the mA and/or kV according to patient size, use of iterative 
reconstruction technique)

 2011 [employer]- All Rights Reserved



Reading location - IP/workstation name: [email or web address]

## 2019-09-16 LAB
ARTERIAL BLOOD FIO2: (no result)
ARTERIAL BLOOD H2CO3: 1.93 MMOL/L (ref 1.05–1.35)
ARTERIAL BLOOD HCO3: 31.5 MMOL/L (ref 20–24)
ARTERIAL BLOOD PCO2: 64 MMHG (ref 35–45)
ARTERIAL BLOOD PH: 7.31 (ref 7.35–7.45)
ARTERIAL BLOOD PO2: 70.6 MMHG (ref 80–100)
ARTERIAL BLOOD TOTAL CO2: 33.5 MMOL/L (ref 23–27)
BASE EXCESS BLDA CALC-SCNC: 3.2 MMOL/L
SAO2 % BLDA: 92.3 % (ref 94–98)

## 2019-09-16 RX ADMIN — INSULIN LISPRO SCH: 100 INJECTION, SOLUTION INTRAVENOUS; SUBCUTANEOUS at 01:45

## 2019-09-16 RX ADMIN — INSULIN LISPRO SCH: 100 INJECTION, SOLUTION INTRAVENOUS; SUBCUTANEOUS at 23:14

## 2019-09-16 RX ADMIN — METHYLPREDNISOLONE SODIUM SUCCINATE SCH MG: 40 INJECTION, POWDER, FOR SOLUTION INTRAMUSCULAR; INTRAVENOUS at 11:05

## 2019-09-16 RX ADMIN — IPRATROPIUM BROMIDE AND ALBUTEROL SULFATE SCH ML: 2.5; .5 SOLUTION RESPIRATORY (INHALATION) at 11:41

## 2019-09-16 RX ADMIN — METHYLPREDNISOLONE SODIUM SUCCINATE SCH MG: 40 INJECTION, POWDER, FOR SOLUTION INTRAMUSCULAR; INTRAVENOUS at 21:31

## 2019-09-16 RX ADMIN — IPRATROPIUM BROMIDE AND ALBUTEROL SULFATE SCH ML: 2.5; .5 SOLUTION RESPIRATORY (INHALATION) at 08:11

## 2019-09-16 RX ADMIN — IPRATROPIUM BROMIDE AND ALBUTEROL SULFATE SCH ML: 2.5; .5 SOLUTION RESPIRATORY (INHALATION) at 00:02

## 2019-09-16 RX ADMIN — INSULIN LISPRO SCH: 100 INJECTION, SOLUTION INTRAVENOUS; SUBCUTANEOUS at 13:53

## 2019-09-16 RX ADMIN — LEVOFLOXACIN SCH MLS/HR: 750 INJECTION, SOLUTION INTRAVENOUS at 11:04

## 2019-09-16 RX ADMIN — KETOROLAC TROMETHAMINE PRN MG: 30 INJECTION, SOLUTION INTRAMUSCULAR at 08:51

## 2019-09-16 RX ADMIN — IPRATROPIUM BROMIDE AND ALBUTEROL SULFATE SCH ML: 2.5; .5 SOLUTION RESPIRATORY (INHALATION) at 15:35

## 2019-09-16 RX ADMIN — INSULIN LISPRO SCH: 100 INJECTION, SOLUTION INTRAVENOUS; SUBCUTANEOUS at 20:10

## 2019-09-16 RX ADMIN — LEVOTHYROXINE SODIUM ANHYDROUS SCH MG: 100 INJECTION, POWDER, LYOPHILIZED, FOR SOLUTION INTRAVENOUS at 11:05

## 2019-09-16 RX ADMIN — IPRATROPIUM BROMIDE AND ALBUTEROL SULFATE SCH ML: 2.5; .5 SOLUTION RESPIRATORY (INHALATION) at 04:18

## 2019-09-16 RX ADMIN — IPRATROPIUM BROMIDE AND ALBUTEROL SULFATE SCH ML: 2.5; .5 SOLUTION RESPIRATORY (INHALATION) at 20:35

## 2019-09-16 RX ADMIN — HEPARIN SODIUM SCH UNIT: 5000 INJECTION, SOLUTION INTRAVENOUS; SUBCUTANEOUS at 11:04

## 2019-09-16 RX ADMIN — NICOTINE SCH EACH: 21 PATCH, EXTENDED RELEASE TOPICAL at 15:26

## 2019-09-16 RX ADMIN — FAMOTIDINE SCH MG: 10 INJECTION INTRAVENOUS at 11:33

## 2019-09-16 RX ADMIN — HEPARIN SODIUM SCH UNIT: 5000 INJECTION, SOLUTION INTRAVENOUS; SUBCUTANEOUS at 21:31

## 2019-09-16 RX ADMIN — FAMOTIDINE SCH MG: 10 INJECTION INTRAVENOUS at 21:31

## 2019-09-16 RX ADMIN — INSULIN LISPRO SCH: 100 INJECTION, SOLUTION INTRAVENOUS; SUBCUTANEOUS at 06:21

## 2019-09-16 RX ADMIN — KETOROLAC TROMETHAMINE PRN MG: 30 INJECTION, SOLUTION INTRAMUSCULAR at 22:53

## 2019-09-16 NOTE — XCELERA REPORT
[address] [address]

                               Tel: [phone]

                               Fax: [phone]



                      Transthoracic Echocardiogram Report

_______________________________________________________________________________



Name: MARK PAYNE

MRN: [id number]                           Age: 48 yrs

Gender: Male                              : 1970

Patient Status: Inpatient                 Patient Location: ICU^601^A

Account #: [id number]

Study Date: 2019 10:31 AM

Accession #: [id number]

_______________________________________________________________________________



Height: 69 in        Weight: 259 lb        BSA: 2.3 m2

_______________________________________________________________________________

Procedure: A two-dimensional transthoracic echocardiogram with color flow and

Doppler was performed. Study Quality: Technically suboptimal. Images were not

obtained from all of the standard acoustic windows due to the limited scope of

the study.

Reason For Study: exertional dyspnea, new pedal edema



History: exertional dyspnea, new pedal edema.

Ordering Physician: SONIYA WU



Performed By: Annelise Baker

_______________________________________________________________________________



Interpretation Summary

The left ventricle is normal in size.

There is normal left ventricular wall thickness.

LV EF is > than 65%.%

The left ventricular ejection fraction is within normal limits.

Doppler measurements suggest impaired left ventricular relaxation, which is

associated with grade I/IV or mild diastolic dysfunction

The left ventricular wall motion is normal.

There is no thrombus.

Cannot assess ASD , VSD , or PFO.

The right ventricle is not well visualized secondary to technical limitations

Suspect RV enlargement , RVH and decreasded RVEF.

Suspect RA enlargement.

Right atrium not well visualized secondary to technical limitations

The left atrial size is normal.

There is no evidence of mitral valve prolapse.

There is no vegetation seen on the mitral valve.

There is no mitral valve stenosis.

There is no mitral regurgitation noted.

There is no aortic valvular vegetation.

There is aortic sclerosis without aortic stenosis.

No aortic regurgitation is present.

Tricuspid valve interogation sub optimal.Probably mild TR , and svere

pulmonary hypertension.RVSP is 64 mm of Hg , with RA mean of 10.

The pulmonic valve is not well visualized.

The aortic root is normal size.

The inferior vena cava was not visualized

There is no pericardial effusion.



MMode/2D Measurements & Calculations

RVDd: 4.1 cm         LVIDd: 5.8 cm     FS: 38.9 %         Ao root diam:

                                                          3.1 cm

IVSd: 0.87 cm        LVIDs: 3.5 cm     EDV(Teich):

                     LVPWd: 1.1 cm     166.4 ml           Ao root area:

                                       ESV(Teich):        7.5 cm2

                                       52.4 ml

                                       EF(Teich): 68.5 %

        _______________________________________________________________

EDV(MOD-sp4):        SV(MOD-sp4):

118.7 ml             63.2 ml

ESV(MOD-sp4):

55.5 ml

EF(MOD-sp4): 53.2 %



Doppler Measurements & Calculations

MV E max melisa:       MV dec slope:        Ao V2 max:         LV V1 max P.0 cm/sec                              164.7 cm/sec       4.5 mmHg

MV A max melisa:       295.9 cm/sec2        Ao max PG:         LV V1 max:

106.1 cm/sec        MV dec time:         10.9 mmHg          105.7 cm/sec

MV E/A: 0.75        0.27 sec



        _______________________________________________________________

PA V2 max:          PI end-d melisa:        TR max melisa:

118.5 cm/sec        173.7 cm/sec         367.9 cm/sec

PA max P.6 mmHg                      TR max P.1 mmHg



Left Ventricle

The left ventricle is normal in size. There is normal left ventricular wall

thickness. LV EF is > than 65%.%. The left ventricular ejection fraction is

within normal limits. Doppler measurements suggest impaired left ventricular

relaxation, which is associated with grade I/IV or mild diastolic dysfunction.

The left ventricular wall motion is normal. There is no thrombus. Cannot

assess ASD , VSD , or PFO.



Right Ventricle

The right ventricle is not well visualized secondary to technical limitations.

Suspect RV enlargement , RVH and decreasded RVEF.





Atria

Suspect RA enlargement. Right atrium not well visualized secondary to

technical limitations. The left atrial size is normal.



Mitral Valve

There is no evidence of mitral valve prolapse. There is no vegetation seen on

the mitral valve. There is no mitral valve stenosis. There is no mitral

regurgitation noted.



Aortic Valve

There is no aortic valvular vegetation. There is no aortic valve stenosis.

There is aortic sclerosis without aortic stenosis. No aortic regurgitation is

present.



Tricuspid Valve

There is no tricuspid stenosis. Tricuspid valve interogation sub

optimal.Probably mild TR , and svere pulmonary hypertension.RVSP is 64 mm of

Hg , with RA mean of 10.





Pulmonic Valve

The pulmonic valve is not well visualized.



Great Vessels

The aortic root is normal size. The inferior vena cava was not visualized.



Effusions

There is no pericardial effusion.





_______________________________________________________________________________

_______________________________________________________________________________

Electronically signed by:      Marcia Phillips      on 2019 07:38 PM



CC: SONIYA WU Lakshmi

## 2019-09-16 NOTE — PDOC PROGRESS REPORT
Subjective


Progress Note for:: 09/16/19


Subjective:: 





Patient complains of: SOB, confusion


History of Present Illness: 


MARK PAYNE is a 48 year old male with a past medical history of COPD not

on home O2, CORAL on CPAP at home, hypothyroidism, history of GI bleed from a 

perforated gastric ulcer, history of pituitary tumor on cabergoline, chronic 

cigarette smoking, obesity and hypertension who was brought in due to increasing

shortness of breath and confusion.





Wife says that patient has been off his CPAP machine for the past 3 weeks due to

recently removed tooth abscess causing pain when he uses the CPAP at home.  She 

says that he has been complaining of slowly progressive shortness of breath for 

almost a week now.  She says that he has chronic cough but it has not been 

significantly more productive than his baseline cough.  He says that yesterday 

patient was noted to be weaker and lethargic.  This morning, he was more 

confused and did not know what day it was.  In the ER, patient was noted to have

severely elevated PCO2 of 104.  Chest x-ray shows left-sided pneumonia.  He was 

placed on BiPAP.





Upon encounter, patient appears lethargic but is arousable.  He is oriented to 

person and place and is able to tell me some of his history.  There was initial 

report that he complained of chest pain but when this was clarified with patient

and wife, patient says that he did not have chest pain but more of shortness of 

breath.  His wife does say that he has been having increasing pedal edema in the

past 2 weeks.





9/15


I was asked to evaluate the patient by Dr. Rivera.


The patient has been drowsy. His inital ABG showed a significant acute on 

chronic respiratory acidosis.


The patient was admitted and intubated fawn 3 weeks about 1 year ago.





9/16


The patient had a quiet night . he is doing much better. he is off BIPAP and on 

a nasal cannula. 


He is fully awake and cogent.





Reason For Visit: 


ACUTE RESPIRATORY FAILURE








Physical Exam


Vital Signs: 


                                        











Temp Pulse Resp BP Pulse Ox


 


 98.4 F   122 H  28 H  129/88 H  94 


 


 09/16/19 08:00  09/16/19 08:11  09/16/19 08:11  09/16/19 08:00  09/16/19 08:11








                                 Intake & Output











 09/15/19 09/16/19 09/17/19





 06:59 06:59 06:59


 


Intake Total  0 


 


Output Total  1470 0


 


Balance  -1470 0


 


Weight  118.4 kg 











General appearance: PRESENT: no acute distress


Head exam: PRESENT: normocephalic


Eye exam: PRESENT: conjunctiva pink, EOMI, PERRLA


Ear exam: PRESENT: normal external ear exam


Mouth exam: PRESENT: moist


Neck exam: ABSENT: carotid bruit, tenderness


Respiratory exam: PRESENT: wheezes.  ABSENT: accessory muscle use


Additional comments: 





Coarse Rales appreciated at the left base


Cardiovascular exam: PRESENT: RRR, +S1, +S2


Pulses: PRESENT: +2 pedal pulses bilateral


Vascular exam: PRESENT: normal capillary refill


GI/Abdominal exam: PRESENT: normal bowel sounds, soft


Rectal exam: PRESENT: deferred


Extremities exam: PRESENT: full ROM.  ABSENT: joint swelling


Musculoskeletal exam: PRESENT: full ROM


Neurological exam: PRESENT: alert, awake, oriented to person, oriented to place,

oriented to time


Psychiatric exam: PRESENT: appropriate affect





Results


Laboratory Results: 


                                        





                                 09/15/19 09:04 





                                 09/15/19 09:57 





                                        











  09/15/19 09/15/19 09/15/19





  09:04 09:04 09:04


 


WBC  11.5 H  


 


RBC  4.86  


 


Hgb  16.0  


 


Hct  49.5  


 


MCV  102 H  


 


MCH  32.9  


 


MCHC  32.3  


 


RDW  16.0 H  


 


Plt Count  300  


 


Seg Neutrophils %  81.7 H  


 


Carbonic Acid   


 


HCO3/H2CO3 Ratio   


 


ABG pH   


 


ABG pCO2   


 


ABG pO2   


 


ABG HCO3   


 


ABG O2 Saturation   


 


ABG Base Excess   


 


VBG pH   


 


VBG pCO2   


 


VBG HCO3   


 


VBG Base Excess   


 


FiO2   


 


Sodium   Cancelled 


 


Potassium   Cancelled 


 


Chloride   Cancelled 


 


Carbon Dioxide   Cancelled 


 


Anion Gap   Cancelled 


 


BUN   Cancelled 


 


Creatinine   Cancelled 


 


Est GFR ( Amer)   Cancelled 


 


Est GFR (Non-Af Amer)   Cancelled 


 


Glucose   Cancelled 


 


Lactic Acid    0.8


 


Calcium   Cancelled 


 


Total Bilirubin   Cancelled 


 


AST   Cancelled 


 


Alkaline Phosphatase   Cancelled 


 


Total Protein   Cancelled 


 


Albumin   Cancelled 


 


TSH   


 


Free T4   


 


Free T3 pg/mL   


 


Urine Color   


 


Urine Appearance   


 


Urine pH   


 


Ur Specific Gravity   


 


Urine Protein   


 


Urine Glucose (UA)   


 


Urine Ketones   


 


Urine Blood   


 


Urine Nitrite   


 


Ur Leukocyte Esterase   


 


Urine WBC (Auto)   


 


Urine RBC (Auto)   














  09/15/19 09/15/19 09/15/19





  09:04 09:04 09:57


 


WBC   


 


RBC   


 


Hgb   


 


Hct   


 


MCV   


 


MCH   


 


MCHC   


 


RDW   


 


Plt Count   


 


Seg Neutrophils %   


 


Carbonic Acid   


 


HCO3/H2CO3 Ratio   


 


ABG pH   


 


ABG pCO2   


 


ABG pO2   


 


ABG HCO3   


 


ABG O2 Saturation   


 


ABG Base Excess   


 


VBG pH   


 


VBG pCO2   


 


VBG HCO3   


 


VBG Base Excess   


 


FiO2   


 


Sodium    141.5


 


Potassium    4.4


 


Chloride    102


 


Carbon Dioxide    34 H


 


Anion Gap    6


 


BUN    21 H


 


Creatinine    0.80


 


Est GFR ( Amer)    > 60


 


Est GFR (Non-Af Amer)   


 


Glucose    110


 


Lactic Acid   


 


Calcium    9.4


 


Total Bilirubin    0.3


 


AST    30


 


Alkaline Phosphatase    61


 


Total Protein    6.8


 


Albumin    4.1


 


TSH  11.10 H  


 


Free T4   0.63 L 


 


Free T3 pg/mL   2.51 L 


 


Urine Color   


 


Urine Appearance   


 


Urine pH   


 


Ur Specific Gravity   


 


Urine Protein   


 


Urine Glucose (UA)   


 


Urine Ketones   


 


Urine Blood   


 


Urine Nitrite   


 


Ur Leukocyte Esterase   


 


Urine WBC (Auto)   


 


Urine RBC (Auto)   














  09/15/19 09/15/19 09/15/19





  10:10 13:52 15:22


 


WBC   


 


RBC   


 


Hgb   


 


Hct   


 


MCV   


 


MCH   


 


MCHC   


 


RDW   


 


Plt Count   


 


Seg Neutrophils %   


 


Carbonic Acid  3.14 H  


 


HCO3/H2CO3 Ratio  11:1  


 


ABG pH  7.14 L*  


 


ABG pCO2  104.4 H*  


 


ABG pO2  73.0 L  


 


ABG HCO3  35.0 H  


 


ABG O2 Saturation  88.5 L  


 


ABG Base Excess  1.7  


 


VBG pH   7.15 L* 


 


VBG pCO2   92.7 H* 


 


VBG HCO3   31.6 


 


VBG Base Excess   -0.7 


 


FiO2  100% NRB  


 


Sodium   


 


Potassium   


 


Chloride   


 


Carbon Dioxide   


 


Anion Gap   


 


BUN   


 


Creatinine   


 


Est GFR ( Amer)   


 


Est GFR (Non-Af Amer)   


 


Glucose   


 


Lactic Acid   


 


Calcium   


 


Total Bilirubin   


 


AST   


 


Alkaline Phosphatase   


 


Total Protein   


 


Albumin   


 


TSH   


 


Free T4   


 


Free T3 pg/mL   


 


Urine Color    YELLOW


 


Urine Appearance    CLEAR


 


Urine pH    6.0


 


Ur Specific Gravity    1.033


 


Urine Protein    >=500 H


 


Urine Glucose (UA)    NEGATIVE


 


Urine Ketones    NEGATIVE


 


Urine Blood    NEGATIVE


 


Urine Nitrite    NEGATIVE


 


Ur Leukocyte Esterase    NEGATIVE


 


Urine WBC (Auto)    4


 


Urine RBC (Auto)    2














  09/15/19 09/16/19





  18:38 03:00


 


WBC  


 


RBC  


 


Hgb  


 


Hct  


 


MCV  


 


MCH  


 


MCHC  


 


RDW  


 


Plt Count  


 


Seg Neutrophils %  


 


Carbonic Acid  2.10 H  1.93 H


 


HCO3/H2CO3 Ratio  15:1  16:1


 


ABG pH  7.29 L  7.31 L


 


ABG pCO2  69.9 H*  64.0 H


 


ABG pO2  76.3 L  70.6 L


 


ABG HCO3  32.8 H  31.5 H


 


ABG O2 Saturation  93.3 L  92.3 L


 


ABG Base Excess  3.4  3.2


 


VBG pH  


 


VBG pCO2  


 


VBG HCO3  


 


VBG Base Excess  


 


FiO2  40%  4 L NC


 


Sodium  


 


Potassium  


 


Chloride  


 


Carbon Dioxide  


 


Anion Gap  


 


BUN  


 


Creatinine  


 


Est GFR ( Amer)  


 


Est GFR (Non-Af Amer)  


 


Glucose  


 


Lactic Acid  


 


Calcium  


 


Total Bilirubin  


 


AST  


 


Alkaline Phosphatase  


 


Total Protein  


 


Albumin  


 


TSH  


 


Free T4  


 


Free T3 pg/mL  


 


Urine Color  


 


Urine Appearance  


 


Urine pH  


 


Ur Specific Gravity  


 


Urine Protein  


 


Urine Glucose (UA)  


 


Urine Ketones  


 


Urine Blood  


 


Urine Nitrite  


 


Ur Leukocyte Esterase  


 


Urine WBC (Auto)  


 


Urine RBC (Auto)  








                                        











  09/15/19 09/15/19 09/15/19





  09:04 09:04 09:57


 


Creatine Kinase  Cancelled   74


 


CK-MB (CK-2)   


 


Troponin I   Cancelled 


 


NT-Pro-B Natriuret Pep   Cancelled 














  09/15/19





  09:57


 


Creatine Kinase 


 


CK-MB (CK-2)  1.80


 


Troponin I  0.031


 


NT-Pro-B Natriuret Pep 











Impressions: 


                                        





Chest X-Ray  09/15/19 08:49


IMPRESSION:  Left pneumonia and parapneumonic effusion.


 








Head CT  09/15/19 15:14


IMPRESSION:  NO ACUTE INTRACRANIAL FINDINGS.


EVIDENCE OF ACUTE STROKE: NO.


 














Assessment & Plan





- Diagnosis


(1) Acute and chronic respiratory failure with hypercapnia


Is this a current diagnosis for this admission?: Yes   


Plan: 


The patient has an ABG consistent with acute on chronic respiratory failure





9/16


His subsequrent ABGs have markedly improved and his mentla status as well.


His wob is normal at this point.








(2) COPD exacerbation


Is this a current diagnosis for this admission?: Yes   


Plan: 


The patient has a long history of chronic COPD. He is on home 02 and uses a CPAP

at .


He was not able to use his CPAP the opast few weeks which likely contributed to 

this worsening respiratory acidosis.





9/16


In the long term the patient likely needs a LABA/steroid type of inhaler








(3) Hypothyroidism (acquired)


Is this a current diagnosis for this admission?: Yes   


Plan: 


The patient has an elevated TSH and low free T4. it appears that he is proably a

bit hypothyrioid. i am giving himn synthorid IV at slightly higher than 1/2 his 

normal dose.








(4) Pneumonia


Qualifiers: 


   Pneumonia type: due to unspecified organism   Laterality: left   Lung 

location: lower lobe of lung   Qualified Code(s): J18.1 - Lobar pneumonia, 

unspecified organism   


Is this a current diagnosis for this admission?: Yes   


Plan: 


The patient has a large LLL +/- lingular pneumonia.


Will request sputm culture. The patient was started empirically on Levaquin





9/16


Repeat CXR pending. the patient denies any sputum production








- Time


Time Spent with patient: 25-34 minutes


Medications reviewed and adjusted accordingly: Yes


Anticipated discharge: Home


Within: within 36 hours

## 2019-09-17 LAB
ABSOLUTE LYMPHOCYTES# (MANUAL): 0.2 10^3/UL (ref 0.5–4.7)
ABSOLUTE MONOCYTES # (MANUAL): 0.6 10^3/UL (ref 0.1–1.4)
ADD MANUAL DIFF: YES
ALBUMIN SERPL-MCNC: 4.1 G/DL (ref 3.5–5)
ALP SERPL-CCNC: 49 U/L (ref 38–126)
ANION GAP SERPL CALC-SCNC: 6 MMOL/L (ref 5–19)
ANISOCYTOSIS BLD QL SMEAR: SLIGHT
AST SERPL-CCNC: 30 U/L (ref 17–59)
BASOPHILS NFR BLD MANUAL: 0 % (ref 0–2)
BILIRUB DIRECT SERPL-MCNC: 0.2 MG/DL (ref 0–0.4)
BILIRUB SERPL-MCNC: 0.4 MG/DL (ref 0.2–1.3)
BUN SERPL-MCNC: 24 MG/DL (ref 7–20)
CALCIUM: 9.8 MG/DL (ref 8.4–10.2)
CHLORIDE SERPL-SCNC: 98 MMOL/L (ref 98–107)
CO2 SERPL-SCNC: 34 MMOL/L (ref 22–30)
EOSINOPHIL NFR BLD MANUAL: 0 % (ref 0–6)
ERYTHROCYTE [DISTWIDTH] IN BLOOD BY AUTOMATED COUNT: 15.4 % (ref 11.5–14)
GLUCOSE SERPL-MCNC: 154 MG/DL (ref 75–110)
HCT VFR BLD CALC: 45 % (ref 37.9–51)
HGB BLD-MCNC: 14.4 G/DL (ref 13.5–17)
MACROCYTES BLD QL SMEAR: (no result)
MCH RBC QN AUTO: 32.4 PG (ref 27–33.4)
MCHC RBC AUTO-ENTMCNC: 32.1 G/DL (ref 32–36)
MCV RBC AUTO: 101 FL (ref 80–97)
MONOCYTES % (MANUAL): 8 % (ref 3–13)
PLATELET # BLD: 242 10^3/UL (ref 150–450)
PLATELET COMMENT: ADEQUATE
POTASSIUM SERPL-SCNC: 4.5 MMOL/L (ref 3.6–5)
PROT SERPL-MCNC: 7 G/DL (ref 6.3–8.2)
RBC # BLD AUTO: 4.45 10^6/UL (ref 4.35–5.55)
SEGMENTED NEUTROPHILS % (MAN): 89 % (ref 42–78)
TOTAL CELLS COUNTED BLD: 100
VARIANT LYMPHS NFR BLD MANUAL: 3 % (ref 13–45)
WBC # BLD AUTO: 8.1 10^3/UL (ref 4–10.5)

## 2019-09-17 RX ADMIN — IPRATROPIUM BROMIDE AND ALBUTEROL SULFATE SCH ML: 2.5; .5 SOLUTION RESPIRATORY (INHALATION) at 00:04

## 2019-09-17 RX ADMIN — HYDROCODONE BITARTRATE AND ACETAMINOPHEN PRN TAB: 5; 325 TABLET ORAL at 18:35

## 2019-09-17 RX ADMIN — NICOTINE SCH EACH: 21 PATCH, EXTENDED RELEASE TOPICAL at 10:48

## 2019-09-17 RX ADMIN — IPRATROPIUM BROMIDE AND ALBUTEROL SULFATE SCH ML: 2.5; .5 SOLUTION RESPIRATORY (INHALATION) at 19:13

## 2019-09-17 RX ADMIN — LEVOTHYROXINE SODIUM ANHYDROUS SCH MG: 100 INJECTION, POWDER, LYOPHILIZED, FOR SOLUTION INTRAVENOUS at 10:47

## 2019-09-17 RX ADMIN — INSULIN LISPRO SCH: 100 INJECTION, SOLUTION INTRAVENOUS; SUBCUTANEOUS at 12:52

## 2019-09-17 RX ADMIN — KETOROLAC TROMETHAMINE PRN MG: 30 INJECTION, SOLUTION INTRAMUSCULAR at 21:26

## 2019-09-17 RX ADMIN — IPRATROPIUM BROMIDE AND ALBUTEROL SULFATE SCH ML: 2.5; .5 SOLUTION RESPIRATORY (INHALATION) at 15:51

## 2019-09-17 RX ADMIN — INSULIN LISPRO SCH: 100 INJECTION, SOLUTION INTRAVENOUS; SUBCUTANEOUS at 06:46

## 2019-09-17 RX ADMIN — LEVOFLOXACIN SCH MLS/HR: 750 INJECTION, SOLUTION INTRAVENOUS at 10:48

## 2019-09-17 RX ADMIN — IPRATROPIUM BROMIDE AND ALBUTEROL SULFATE SCH ML: 2.5; .5 SOLUTION RESPIRATORY (INHALATION) at 12:12

## 2019-09-17 RX ADMIN — INSULIN LISPRO SCH: 100 INJECTION, SOLUTION INTRAVENOUS; SUBCUTANEOUS at 17:59

## 2019-09-17 RX ADMIN — FAMOTIDINE SCH MG: 10 INJECTION INTRAVENOUS at 10:48

## 2019-09-17 RX ADMIN — IPRATROPIUM BROMIDE AND ALBUTEROL SULFATE SCH ML: 2.5; .5 SOLUTION RESPIRATORY (INHALATION) at 08:07

## 2019-09-17 RX ADMIN — METHYLPREDNISOLONE SODIUM SUCCINATE SCH MG: 40 INJECTION, POWDER, FOR SOLUTION INTRAMUSCULAR; INTRAVENOUS at 21:21

## 2019-09-17 RX ADMIN — FAMOTIDINE SCH MG: 10 INJECTION INTRAVENOUS at 21:21

## 2019-09-17 RX ADMIN — METHYLPREDNISOLONE SODIUM SUCCINATE SCH MG: 40 INJECTION, POWDER, FOR SOLUTION INTRAMUSCULAR; INTRAVENOUS at 12:51

## 2019-09-17 RX ADMIN — HYDROCODONE BITARTRATE AND ACETAMINOPHEN PRN TAB: 5; 325 TABLET ORAL at 10:47

## 2019-09-17 RX ADMIN — KETOROLAC TROMETHAMINE PRN MG: 30 INJECTION, SOLUTION INTRAMUSCULAR at 05:50

## 2019-09-17 RX ADMIN — IPRATROPIUM BROMIDE AND ALBUTEROL SULFATE SCH ML: 2.5; .5 SOLUTION RESPIRATORY (INHALATION) at 03:27

## 2019-09-17 RX ADMIN — HEPARIN SODIUM SCH UNIT: 5000 INJECTION, SOLUTION INTRAVENOUS; SUBCUTANEOUS at 10:48

## 2019-09-17 RX ADMIN — HEPARIN SODIUM SCH UNIT: 5000 INJECTION, SOLUTION INTRAVENOUS; SUBCUTANEOUS at 21:21

## 2019-09-17 NOTE — RADIOLOGY REPORT (SQ)
EXAM DESCRIPTION:  CHEST 2 VIEWS



COMPLETED DATE/TIME:  9/17/2019 3:02 pm



REASON FOR STUDY:  LLL pneumonia



COMPARISON:  9/15/2019



EXAM PARAMETERS:  NUMBER OF VIEWS: two views

TECHNIQUE: Digital Frontal and Lateral radiographic views of the chest acquired.

RADIATION DOSE: NA

LIMITATIONS: none



FINDINGS:  LUNGS AND PLEURA: Persistent left pleural effusion and left lower lobe infiltrate most lik
ely pneumonia.  Probable small right effusion.

MEDIASTINUM AND HILAR STRUCTURES: No masses or contour abnormalities.

HEART AND VASCULAR STRUCTURES: Heart is enlarged with central vascular congestion.

BONES: No acute findings.

HARDWARE: None in the chest.

OTHER: No other significant finding.



IMPRESSION:  1.  Persistent left lower lobe infiltrate slightly improved from prior study.  There are
 bilateral pleural effusions left greater than right.

2.  Mild vascular congestion.



TECHNICAL DOCUMENTATION:  JOB ID:  [id number]

 2011 [email or web address]- All Rights Reserved



Reading location - IP/workstation name: [email or web address]

## 2019-09-17 NOTE — PDOC PROGRESS REPORT
Subjective


Progress Note for:: 09/17/19


Subjective:: 





Patient complains of: SOB, confusion


History of Present Illness: 


MARK PAYNE is a 48 year old male with a past medical history of COPD not

on home O2, CORAL on CPAP at home, hypothyroidism, history of GI bleed from a 

perforated gastric ulcer, history of pituitary tumor on cabergoline, chronic 

cigarette smoking, obesity and hypertension who was brought in due to increasing

shortness of breath and confusion.





Wife says that patient has been off his CPAP machine for the past 3 weeks due to

recently removed tooth abscess causing pain when he uses the CPAP at home.  She 

says that he has been complaining of slowly progressive shortness of breath for 

almost a week now.  She says that he has chronic cough but it has not been 

significantly more productive than his baseline cough.  He says that yesterday 

patient was noted to be weaker and lethargic.  This morning, he was more 

confused and did not know what day it was.  In the ER, patient was noted to have

severely elevated PCO2 of 104.  Chest x-ray shows left-sided pneumonia.  He was 

placed on BiPAP.





Upon encounter, patient appears lethargic but is arousable.  He is oriented to 

person and place and is able to tell me some of his history.  There was initial 

report that he complained of chest pain but when this was clarified with patient

and wife, patient says that he did not have chest pain but more of shortness of 

breath.  His wife does say that he has been having increasing pedal edema in the

past 2 weeks.





9/15


I was asked to evaluate the patient by Dr. Rivera.


The patient has been drowsy. His inital ABG showed a significant acute on 

chronic respiratory acidosis.


The patient was admitted and intubated fawn 3 weeks about 1 year ago.





9/16


The patient had a quiet night . he is doing much better. he is off BIPAP and on 

a nasal cannula. 


He is fully awake and cogent.





9/17


The patient appears to be doing relatively well. Uses CPAP at  and nasal 

cannula by day


Reason For Visit: 


ACUTE RESPIRATORY FAILURE








Physical Exam


Vital Signs: 


                                        











Temp Pulse Resp BP Pulse Ox


 


 98.1 F   103 H  20   113/91 H  92 


 


 09/17/19 07:13  09/17/19 07:13  09/17/19 07:13  09/17/19 07:13  09/17/19 07:13








                                 Intake & Output











 09/16/19 09/17/19 09/18/19





 06:59 06:59 06:59


 


Intake Total 0 150 


 


Output Total 1470 1275 


 


Balance -1470 -1125 


 


Weight 118.4 kg 120.2 kg 











General appearance: PRESENT: no acute distress


Head exam: PRESENT: atraumatic


Eye exam: PRESENT: conjunctiva pink, EOMI


Ear exam: PRESENT: normal external ear exam


Mouth exam: PRESENT: moist


Neck exam: PRESENT: full ROM.  ABSENT: meningismus, tenderness


Pulses: PRESENT: normal dorsalis pedis pul


Vascular exam: PRESENT: normal capillary refill


GI/Abdominal exam: PRESENT: normal bowel sounds, soft


Extremities exam: PRESENT: full ROM.  ABSENT: calf tenderness


Musculoskeletal exam: PRESENT: full ROM


Psychiatric exam: PRESENT: appropriate affect





Results


Laboratory Results: 


                                        





                                 09/17/19 04:05 





                                 09/17/19 04:05 





                                        











  09/17/19 09/17/19





  04:05 04:05


 


WBC  8.1 


 


RBC  4.45 


 


Hgb  14.4 


 


Hct  45.0 


 


MCV  101 H 


 


MCH  32.4 


 


MCHC  32.1 


 


RDW  15.4 H 


 


Plt Count  242 


 


Seg Neutrophils %  Not Reportable 


 


Sodium   137.7


 


Potassium   4.5


 


Chloride   98


 


Carbon Dioxide   34 H


 


Anion Gap   6


 


BUN   24 H


 


Creatinine   0.88


 


Est GFR (African Amer)   > 60


 


Glucose   154 H


 


Calcium   9.8


 


Total Bilirubin   0.4


 


AST   30


 


Alkaline Phosphatase   49


 


Total Protein   7.0


 


Albumin   4.1








                                        











  09/15/19 09/15/19 09/15/19





  09:04 09:04 09:57


 


Creatine Kinase  Cancelled   74


 


CK-MB (CK-2)   


 


Troponin I   Cancelled 


 


NT-Pro-B Natriuret Pep   Cancelled 














  09/15/19





  09:57


 


Creatine Kinase 


 


CK-MB (CK-2)  1.80


 


Troponin I  0.031


 


NT-Pro-B Natriuret Pep 











Impressions: 


                                        





Chest X-Ray  09/15/19 08:49


IMPRESSION:  Left pneumonia and parapneumonic effusion.


 








Head CT  09/15/19 15:14


IMPRESSION:  NO ACUTE INTRACRANIAL FINDINGS.


EVIDENCE OF ACUTE STROKE: NO.


 














Assessment & Plan





- Diagnosis


(1) Acute and chronic respiratory failure with hypercapnia


Is this a current diagnosis for this admission?: Yes   


Plan: 


The patient has an ABG consistent with acute on chronic respiratory failure





9/16


His subsequrent ABGs have markedly improved and his mentla status as well.


His wob is normal at this point.





9/17 THGe patient apears comfortable. He is awake and oriented


Hos Resp rate is normal


02 sata re fairly nprmal on low flow 02.








(2) COPD exacerbation


Is this a current diagnosis for this admission?: Yes   





(3) Pneumonia


Qualifiers: 


   Pneumonia type: due to unspecified organism   Laterality: left   Lung 

location: lower lobe of lung   Qualified Code(s): J18.1 - Lobar pneumonia, 

unspecified organism   


Is this a current diagnosis for this admission?: Yes   


Plan: 


The patient has a large LLL +/- lingular pneumonia.


Will request sputm culture. The patient was started empirically on Levaquin





9/16


Repeat CXR pending. the patient denies any sputum production


9/17


Blood cultures of 9/15 negative


The patient remains on Levaquin.


WBC down form 9/15 to 9/17








(4) Hypothyroidism (acquired)


Is this a current diagnosis for this admission?: Yes   


Plan: 


The patient has an elevated TSH and low free T4. it appears that he is proably a

bit hypothyrioid. i am giving himn synthorid IV at slightly higher than 1/2 his 

normal dose.


9/17


Will recheck TFTs in a week








- Time


Time Spent with patient: 15-24 minutes


Anticipated discharge: Home


Within: within 36 hours Airway patent. Nasal mucosa clear. Mouth with normal mucosa. Throat has no vesicles, no oropharyngeal exudates and uvula is midline. Airway patent. Nasal mucosa clear. Mouth with normal mucosa. Throat has no vesicles, no oropharyngeal exudates and uvula is midline. Airway patent. Nasal mucosa clear. Mouth with normal mucosa. Throat has no vesicles, no oropharyngeal exudates and uvula is midline.

## 2019-09-18 VITALS — DIASTOLIC BLOOD PRESSURE: 89 MMHG | SYSTOLIC BLOOD PRESSURE: 143 MMHG

## 2019-09-18 LAB
ANION GAP SERPL CALC-SCNC: 7 MMOL/L (ref 5–19)
BUN SERPL-MCNC: 19 MG/DL (ref 7–20)
CALCIUM: 10.1 MG/DL (ref 8.4–10.2)
CHLORIDE SERPL-SCNC: 98 MMOL/L (ref 98–107)
CO2 SERPL-SCNC: 33 MMOL/L (ref 22–30)
ERYTHROCYTE [DISTWIDTH] IN BLOOD BY AUTOMATED COUNT: 15.7 % (ref 11.5–14)
GLUCOSE SERPL-MCNC: 112 MG/DL (ref 75–110)
HCT VFR BLD CALC: 48 % (ref 37.9–51)
HGB BLD-MCNC: 15.5 G/DL (ref 13.5–17)
MCH RBC QN AUTO: 32.4 PG (ref 27–33.4)
MCHC RBC AUTO-ENTMCNC: 32.2 G/DL (ref 32–36)
MCV RBC AUTO: 101 FL (ref 80–97)
PLATELET # BLD: 222 10^3/UL (ref 150–450)
POTASSIUM SERPL-SCNC: 4.7 MMOL/L (ref 3.6–5)
RBC # BLD AUTO: 4.77 10^6/UL (ref 4.35–5.55)
WBC # BLD AUTO: 11.7 10^3/UL (ref 4–10.5)

## 2019-09-18 RX ADMIN — HEPARIN SODIUM SCH UNIT: 5000 INJECTION, SOLUTION INTRAVENOUS; SUBCUTANEOUS at 10:57

## 2019-09-18 RX ADMIN — IPRATROPIUM BROMIDE AND ALBUTEROL SULFATE SCH ML: 2.5; .5 SOLUTION RESPIRATORY (INHALATION) at 12:18

## 2019-09-18 RX ADMIN — HYDROCODONE BITARTRATE AND ACETAMINOPHEN PRN TAB: 5; 325 TABLET ORAL at 07:44

## 2019-09-18 RX ADMIN — NICOTINE SCH EACH: 21 PATCH, EXTENDED RELEASE TOPICAL at 10:56

## 2019-09-18 RX ADMIN — INSULIN LISPRO SCH: 100 INJECTION, SOLUTION INTRAVENOUS; SUBCUTANEOUS at 05:21

## 2019-09-18 RX ADMIN — FAMOTIDINE SCH MG: 10 INJECTION INTRAVENOUS at 10:58

## 2019-09-18 RX ADMIN — INSULIN LISPRO SCH: 100 INJECTION, SOLUTION INTRAVENOUS; SUBCUTANEOUS at 00:58

## 2019-09-18 RX ADMIN — HYDROCODONE BITARTRATE AND ACETAMINOPHEN PRN TAB: 5; 325 TABLET ORAL at 00:51

## 2019-09-18 RX ADMIN — INSULIN LISPRO SCH: 100 INJECTION, SOLUTION INTRAVENOUS; SUBCUTANEOUS at 13:23

## 2019-09-18 RX ADMIN — IPRATROPIUM BROMIDE AND ALBUTEROL SULFATE SCH ML: 2.5; .5 SOLUTION RESPIRATORY (INHALATION) at 03:47

## 2019-09-18 RX ADMIN — IPRATROPIUM BROMIDE AND ALBUTEROL SULFATE SCH ML: 2.5; .5 SOLUTION RESPIRATORY (INHALATION) at 08:04

## 2019-09-18 RX ADMIN — IPRATROPIUM BROMIDE AND ALBUTEROL SULFATE SCH ML: 2.5; .5 SOLUTION RESPIRATORY (INHALATION) at 00:28

## 2019-09-18 RX ADMIN — LEVOTHYROXINE SODIUM ANHYDROUS SCH: 100 INJECTION, POWDER, LYOPHILIZED, FOR SOLUTION INTRAVENOUS at 11:18

## 2019-09-18 NOTE — PDOC PROGRESS REPORT
Subjective


Progress Note for:: 09/18/19


Subjective:: 





ICU Progress Note.


Mr Edwards is a 49 yo man admitted with acute on chronic resp failure, AECOPD, 

CAP.


He used his bipap overnight and is on nasal cannula this am. He reports feeling 

better.


Reason For Visit: 


ACUTE RESPIRATORY FAILURE








Physical Exam


Vital Signs: 


                                        











Temp Pulse Resp BP Pulse Ox


 


 97.5 F   99   15   147/100 H  96 


 


 09/18/19 07:08  09/18/19 08:07  09/18/19 08:07  09/18/19 07:08  09/18/19 08:07








                                 Intake & Output











 09/17/19 09/18/19 09/19/19





 06:59 06:59 06:59


 


Intake Total 150 150 


 


Output Total 1275 2185 280


 


Balance -8015 -3325 -280


 


Weight 120.2 kg 122.1 kg 











General appearance: PRESENT: no acute distress, well-developed, well-nourished


Head exam: PRESENT: atraumatic, normocephalic


Respiratory exam: PRESENT: wheezes


Cardiovascular exam: PRESENT: RRR


GI/Abdominal exam: PRESENT: soft





Results


Laboratory Results: 


                                        





                                 09/17/19 04:05 





                                 09/17/19 04:05 





                                        





09/15/19 15:22   Clean Catch Midstream   Legionella Urinary Antigen - Final





                                        











  09/15/19 09/15/19 09/15/19





  09:04 09:04 09:57


 


Creatine Kinase  Cancelled   74


 


CK-MB (CK-2)   


 


Troponin I   Cancelled 


 


NT-Pro-B Natriuret Pep   Cancelled 














  09/15/19





  09:57


 


Creatine Kinase 


 


CK-MB (CK-2)  1.80


 


Troponin I  0.031


 


NT-Pro-B Natriuret Pep 











Impressions: 


                                        





Head CT  09/15/19 15:14


IMPRESSION:  NO ACUTE INTRACRANIAL FINDINGS.


EVIDENCE OF ACUTE STROKE: NO.


 








Chest X-Ray  09/17/19 08:00


IMPRESSION:  1.  Persistent left lower lobe infiltrate slightly improved from 

prior study.  There are bilateral pleural effusions left greater than right.


2.  Mild vascular congestion.


 














Assessment & Plan





- Diagnosis


(1) Acute and chronic respiratory failure with hypercapnia


Is this a current diagnosis for this admission?: Yes   


Plan: 


Acute on chronic respiratory failure, resolving. Continue bipap qhs and prn. Pt 

has CORAL and is on CPAP at home. Is on 2liters NC. Wean to RA as tolerated.








(2) COPD exacerbation


Is this a current diagnosis for this admission?: Yes   


Plan: 


AECOPD, resolving. Continue breathing treatments. Change steroids to po. 

Continue levaquin and change to po.








(3) Pneumonia


Qualifiers: 


   Pneumonia type: due to unspecified organism   Laterality: left   Lung 

location: lower lobe of lung   Qualified Code(s): J18.1 - Lobar pneumonia, 

unspecified organism   


Is this a current diagnosis for this admission?: Yes   


Plan: 


CAP, resolving. Day 4 Levaquin. Will change to po.








(4) Hypothyroidism (acquired)


Is this a current diagnosis for this admission?: Yes   


Plan: 


Hypothyroidism: continue synthroid. Also monitoring blood sugars.








(5) DVT prophylaxis


Is this a current diagnosis for this admission?: Yes   


Plan: 


SQ heparin








(6) Disposition


Is this a current diagnosis for this admission?: No   


Plan: 


Possibly home later today.

## 2019-09-18 NOTE — PDOC DISCHARGE SUMMARY
General





- Admit/Disc Date/PCP


Admission Date/Primary Care Provider: 


  09/15/19 15:42





  CANDICE JAMES PA-C





Discharge Date: 09/18/19





- Discharge Diagnosis


(1) Acute and chronic respiratory failure with hypercapnia


Is this a current diagnosis for this admission?: Yes   





(2) COPD exacerbation


Is this a current diagnosis for this admission?: Yes   





(3) Pneumonia


Is this a current diagnosis for this admission?: Yes   





(4) Hypothyroidism (acquired)


Is this a current diagnosis for this admission?: Yes   





(5) DVT prophylaxis


Is this a current diagnosis for this admission?: No   





(6) Disposition


Is this a current diagnosis for this admission?: No   





- Additional Information


Resuscitation Status: Full Code


Discharge Diet: Cardiac


Discharge Activity: Activity As Tolerated


Prescriptions: 


Prednisone [Deltasone 20 mg Tablet] 20 mg PO DAILY #30 tablet


Levofloxacin [Levaquin 750 mg Tablet] 750 mg PO DAILY 4 Days #7 tablet


Nicotine [Nicoderm 21 mg/24 Hr Transderm Patch] 1 each TD DAILY #30 patch.td24


Home Medications: 








Cabergoline [Cabergoline 0.5 mg Tablet] 0.25 mg PO TUFR@1000 06/03/18 


Levothyroxine Sodium [Synthroid 0.112 mg Tablet] 0.224 mg PO Q6AM 06/03/18 


Albuterol Sulfate [Albuterol Sulfate Hfa] 2 puff IH Q6HP PRN 09/15/19 


Ipratropium/Albuterol Sulfate [Duoneb 3 ml Ampul] 3 ml NEB RTQ4HP PRN 09/15/19 


Levofloxacin [Levaquin 750 mg Tablet] 750 mg PO DAILY 4 Days #7 tablet 09/18/19 


Nicotine [Nicoderm 21 mg/24 Hr Transderm Patch] 1 each TD DAILY #30 patch.td24 

09/18/19 


Prednisone [Deltasone 20 mg Tablet] 20 mg PO DAILY #30 tablet 09/18/19 








Additional Information: 





Pt also being discharged on 2liters home O2.





History of Present Illness


History of Present Illness: 


MARK PAYNE is a 48 year old male who was admitted on 9/15 with 

increasing shortness of breath. Pt was found to be in acute on chronic resp 

failure. He also had AECODP and as well as PNA. 








Hospital Course


Hospital Course: 





Pt was placed on bipap, started on steroids, duonebs, and IV levaquin. He has 

progressively improved. On the day of discharge, he is afebrile, with stable vi

tals signs. He is on 2 liters NC. He did become hypoxic with an O2 sat of 88% on

RA upon ambulation. Therefore, he will be discharged home with oxygen. He has 

been counseled to quit smoking and to follow up with his PCP in the next 3-5 

days.





Physical Exam


Vital Signs: 


                                        











Temp Pulse Resp BP Pulse Ox


 


 98.1 F   105 H  22 H  143/89 H  97 


 


 09/18/19 12:00  09/18/19 12:24  09/18/19 12:24  09/18/19 12:00  09/18/19 12:24








                                 Intake & Output











 09/17/19 09/18/19 09/19/19





 06:59 06:59 06:59


 


Intake Total 150 150 


 


Output Total 1275 8575 280


 


Balance -1125 -3325 -280


 


Weight 120.2 kg 122.1 kg 











Physical Exam: 





please refer to today's progress note.





Results


Laboratory Results: 


                                        





                                 09/18/19 10:49 





                                 09/18/19 09:05 





                                        











  09/18/19 09/18/19 09/18/19





  08:24 08:24 09:05


 


WBC  Cancelled  


 


RBC  Cancelled  


 


Hgb  Cancelled  


 


Hct  Cancelled  


 


MCV  Cancelled  


 


MCH  Cancelled  


 


MCHC  Cancelled  


 


RDW  Cancelled  


 


Plt Count  Cancelled  


 


Sodium   Cancelled  137.6


 


Potassium   Cancelled  4.7


 


Chloride   Cancelled  98


 


Carbon Dioxide   Cancelled  33 H


 


Anion Gap   Cancelled  7


 


BUN   Cancelled  19


 


Creatinine   Cancelled  0.74


 


Est GFR ( Amer)   Cancelled  > 60


 


Est GFR (Non-Af Amer)   Cancelled 


 


Glucose   Cancelled  112 H


 


Calcium   Cancelled  10.1














  09/18/19





  10:49


 


WBC  11.7 H


 


RBC  4.77


 


Hgb  15.5


 


Hct  48.0


 


MCV  101 H


 


MCH  32.4


 


MCHC  32.2


 


RDW  15.7 H


 


Plt Count  222


 


Sodium 


 


Potassium 


 


Chloride 


 


Carbon Dioxide 


 


Anion Gap 


 


BUN 


 


Creatinine 


 


Est GFR ( Amer) 


 


Est GFR (Non-Af Amer) 


 


Glucose 


 


Calcium 








                                        





09/15/19 15:22   Clean Catch Midstream   [facility] Urinary Antigen - Final





                                        











  09/15/19 09/15/19 09/15/19





  09:04 09:04 09:57


 


Creatine Kinase  Cancelled   74


 


CK-MB (CK-2)   


 


Troponin I   Cancelled 


 


NT-Pro-B Natriuret Pep   Cancelled 














  09/15/19





  09:57


 


Creatine Kinase 


 


CK-MB (CK-2)  1.80


 


Troponin I  0.031


 


NT-Pro-B Natriuret Pep 











Impressions: 


                                        





Head CT  09/15/19 15:14


IMPRESSION:  NO ACUTE INTRACRANIAL FINDINGS.


EVIDENCE OF ACUTE STROKE: NO.


 








Chest X-Ray  09/17/19 08:00


IMPRESSION:  1.  Persistent left lower lobe infiltrate slightly improved from 

prior study.  There are bilateral pleural effusions left greater than right.


2.  Mild vascular congestion.


 














Qualifiers





- *


PATIENT BEING DISCHARGED WITH ANY OF THE FOLLOWING DIAGNOSIS: No





Acute Heart Failure





- **


Is this a Heart Failure Patient?: No





Plan


Time Spent: Greater than 30 Minutes





Provider Note


Provider Note: 





Pt will be discharged home. He has been instructed to f/u with his PCP in 3-5 

day.